# Patient Record
Sex: MALE | Race: WHITE | NOT HISPANIC OR LATINO | Employment: FULL TIME | URBAN - METROPOLITAN AREA
[De-identification: names, ages, dates, MRNs, and addresses within clinical notes are randomized per-mention and may not be internally consistent; named-entity substitution may affect disease eponyms.]

---

## 2019-04-17 ENCOUNTER — EVALUATION (OUTPATIENT)
Dept: PHYSICAL THERAPY | Facility: REHABILITATION | Age: 49
End: 2019-04-17
Payer: COMMERCIAL

## 2019-04-17 DIAGNOSIS — M54.12 CERVICAL RADICULOPATHY: Primary | ICD-10-CM

## 2019-04-17 PROCEDURE — 97163 PT EVAL HIGH COMPLEX 45 MIN: CPT | Performed by: PHYSICAL THERAPIST

## 2019-04-17 PROCEDURE — 97012 MECHANICAL TRACTION THERAPY: CPT | Performed by: PHYSICAL THERAPIST

## 2019-04-19 ENCOUNTER — OFFICE VISIT (OUTPATIENT)
Dept: PHYSICAL THERAPY | Facility: REHABILITATION | Age: 49
End: 2019-04-19
Payer: COMMERCIAL

## 2019-04-19 DIAGNOSIS — M54.12 CERVICAL RADICULOPATHY: Primary | ICD-10-CM

## 2019-04-19 PROCEDURE — 97140 MANUAL THERAPY 1/> REGIONS: CPT | Performed by: PHYSICAL THERAPIST

## 2019-04-22 ENCOUNTER — OFFICE VISIT (OUTPATIENT)
Dept: PHYSICAL THERAPY | Facility: REHABILITATION | Age: 49
End: 2019-04-22
Payer: COMMERCIAL

## 2019-04-22 ENCOUNTER — TRANSCRIBE ORDERS (OUTPATIENT)
Dept: PHYSICAL THERAPY | Facility: REHABILITATION | Age: 49
End: 2019-04-22

## 2019-04-22 DIAGNOSIS — M54.12 CERVICAL RADICULOPATHY: Primary | ICD-10-CM

## 2019-04-22 PROCEDURE — 97112 NEUROMUSCULAR REEDUCATION: CPT | Performed by: PHYSICAL THERAPIST

## 2019-04-22 PROCEDURE — 97140 MANUAL THERAPY 1/> REGIONS: CPT | Performed by: PHYSICAL THERAPIST

## 2019-04-22 PROCEDURE — 97012 MECHANICAL TRACTION THERAPY: CPT | Performed by: PHYSICAL THERAPIST

## 2019-04-24 ENCOUNTER — OFFICE VISIT (OUTPATIENT)
Dept: PHYSICAL THERAPY | Facility: REHABILITATION | Age: 49
End: 2019-04-24
Payer: COMMERCIAL

## 2019-04-24 DIAGNOSIS — M54.12 CERVICAL RADICULOPATHY: Primary | ICD-10-CM

## 2019-04-24 PROCEDURE — G0283 ELEC STIM OTHER THAN WOUND: HCPCS | Performed by: PHYSICAL THERAPIST

## 2019-04-24 PROCEDURE — 97014 ELECTRIC STIMULATION THERAPY: CPT | Performed by: PHYSICAL THERAPIST

## 2019-04-24 PROCEDURE — 97112 NEUROMUSCULAR REEDUCATION: CPT | Performed by: PHYSICAL THERAPIST

## 2019-04-24 PROCEDURE — 97140 MANUAL THERAPY 1/> REGIONS: CPT | Performed by: PHYSICAL THERAPIST

## 2019-04-26 ENCOUNTER — APPOINTMENT (OUTPATIENT)
Dept: PHYSICAL THERAPY | Facility: REHABILITATION | Age: 49
End: 2019-04-26
Payer: COMMERCIAL

## 2019-04-29 ENCOUNTER — OFFICE VISIT (OUTPATIENT)
Dept: PHYSICAL THERAPY | Facility: REHABILITATION | Age: 49
End: 2019-04-29
Payer: COMMERCIAL

## 2019-04-29 DIAGNOSIS — M54.12 CERVICAL RADICULOPATHY: Primary | ICD-10-CM

## 2019-04-29 PROCEDURE — 97140 MANUAL THERAPY 1/> REGIONS: CPT | Performed by: PHYSICAL THERAPIST

## 2019-04-29 PROCEDURE — G0283 ELEC STIM OTHER THAN WOUND: HCPCS | Performed by: PHYSICAL THERAPIST

## 2019-04-29 PROCEDURE — 97014 ELECTRIC STIMULATION THERAPY: CPT | Performed by: PHYSICAL THERAPIST

## 2019-05-01 ENCOUNTER — OFFICE VISIT (OUTPATIENT)
Dept: PHYSICAL THERAPY | Facility: REHABILITATION | Age: 49
End: 2019-05-01
Payer: COMMERCIAL

## 2019-05-01 DIAGNOSIS — M54.12 CERVICAL RADICULOPATHY: Primary | ICD-10-CM

## 2019-05-01 PROCEDURE — G0283 ELEC STIM OTHER THAN WOUND: HCPCS | Performed by: PHYSICAL THERAPIST

## 2019-05-01 PROCEDURE — 97140 MANUAL THERAPY 1/> REGIONS: CPT | Performed by: PHYSICAL THERAPIST

## 2019-05-01 PROCEDURE — 97014 ELECTRIC STIMULATION THERAPY: CPT | Performed by: PHYSICAL THERAPIST

## 2019-05-01 PROCEDURE — 97112 NEUROMUSCULAR REEDUCATION: CPT | Performed by: PHYSICAL THERAPIST

## 2019-05-03 ENCOUNTER — OFFICE VISIT (OUTPATIENT)
Dept: PHYSICAL THERAPY | Facility: REHABILITATION | Age: 49
End: 2019-05-03
Payer: COMMERCIAL

## 2019-05-03 DIAGNOSIS — M54.12 CERVICAL RADICULOPATHY: Primary | ICD-10-CM

## 2019-05-03 PROCEDURE — 97112 NEUROMUSCULAR REEDUCATION: CPT | Performed by: PHYSICAL THERAPIST

## 2019-05-03 PROCEDURE — 97140 MANUAL THERAPY 1/> REGIONS: CPT | Performed by: PHYSICAL THERAPIST

## 2019-05-06 ENCOUNTER — APPOINTMENT (OUTPATIENT)
Dept: PHYSICAL THERAPY | Facility: REHABILITATION | Age: 49
End: 2019-05-06
Payer: COMMERCIAL

## 2019-05-08 ENCOUNTER — OFFICE VISIT (OUTPATIENT)
Dept: PHYSICAL THERAPY | Facility: REHABILITATION | Age: 49
End: 2019-05-08
Payer: COMMERCIAL

## 2019-05-08 DIAGNOSIS — M54.12 CERVICAL RADICULOPATHY: Primary | ICD-10-CM

## 2019-05-08 PROCEDURE — 97112 NEUROMUSCULAR REEDUCATION: CPT | Performed by: PHYSICAL THERAPIST

## 2019-05-08 PROCEDURE — 97140 MANUAL THERAPY 1/> REGIONS: CPT | Performed by: PHYSICAL THERAPIST

## 2019-05-08 PROCEDURE — 97014 ELECTRIC STIMULATION THERAPY: CPT | Performed by: PHYSICAL THERAPIST

## 2019-05-08 PROCEDURE — G0283 ELEC STIM OTHER THAN WOUND: HCPCS | Performed by: PHYSICAL THERAPIST

## 2019-05-10 ENCOUNTER — OFFICE VISIT (OUTPATIENT)
Dept: PHYSICAL THERAPY | Facility: REHABILITATION | Age: 49
End: 2019-05-10
Payer: COMMERCIAL

## 2019-05-10 DIAGNOSIS — M54.12 CERVICAL RADICULOPATHY: Primary | ICD-10-CM

## 2019-05-10 PROCEDURE — 97112 NEUROMUSCULAR REEDUCATION: CPT | Performed by: PHYSICAL THERAPIST

## 2019-05-10 PROCEDURE — 97140 MANUAL THERAPY 1/> REGIONS: CPT | Performed by: PHYSICAL THERAPIST

## 2019-05-13 ENCOUNTER — OFFICE VISIT (OUTPATIENT)
Dept: PHYSICAL THERAPY | Facility: REHABILITATION | Age: 49
End: 2019-05-13
Payer: COMMERCIAL

## 2019-05-13 DIAGNOSIS — M54.12 CERVICAL RADICULOPATHY: Primary | ICD-10-CM

## 2019-05-13 PROCEDURE — 97112 NEUROMUSCULAR REEDUCATION: CPT | Performed by: PHYSICAL THERAPIST

## 2019-05-13 PROCEDURE — 97140 MANUAL THERAPY 1/> REGIONS: CPT | Performed by: PHYSICAL THERAPIST

## 2019-05-15 ENCOUNTER — OFFICE VISIT (OUTPATIENT)
Dept: PHYSICAL THERAPY | Facility: REHABILITATION | Age: 49
End: 2019-05-15
Payer: COMMERCIAL

## 2019-05-15 DIAGNOSIS — M54.12 CERVICAL RADICULOPATHY: Primary | ICD-10-CM

## 2019-05-15 PROCEDURE — 97112 NEUROMUSCULAR REEDUCATION: CPT

## 2019-05-15 PROCEDURE — 97140 MANUAL THERAPY 1/> REGIONS: CPT

## 2019-05-17 ENCOUNTER — APPOINTMENT (OUTPATIENT)
Dept: PHYSICAL THERAPY | Facility: REHABILITATION | Age: 49
End: 2019-05-17
Payer: COMMERCIAL

## 2019-05-20 ENCOUNTER — OFFICE VISIT (OUTPATIENT)
Dept: PHYSICAL THERAPY | Facility: REHABILITATION | Age: 49
End: 2019-05-20
Payer: COMMERCIAL

## 2019-05-20 DIAGNOSIS — M54.12 CERVICAL RADICULOPATHY: Primary | ICD-10-CM

## 2019-05-20 PROCEDURE — 97140 MANUAL THERAPY 1/> REGIONS: CPT | Performed by: PHYSICAL THERAPIST

## 2019-05-20 PROCEDURE — 97112 NEUROMUSCULAR REEDUCATION: CPT | Performed by: PHYSICAL THERAPIST

## 2019-05-21 ENCOUNTER — OFFICE VISIT (OUTPATIENT)
Dept: PHYSICAL THERAPY | Facility: REHABILITATION | Age: 49
End: 2019-05-21
Payer: COMMERCIAL

## 2019-05-21 DIAGNOSIS — M54.12 CERVICAL RADICULOPATHY: Primary | ICD-10-CM

## 2019-05-21 PROCEDURE — 97112 NEUROMUSCULAR REEDUCATION: CPT

## 2019-05-21 PROCEDURE — 97140 MANUAL THERAPY 1/> REGIONS: CPT

## 2019-05-23 ENCOUNTER — EVALUATION (OUTPATIENT)
Dept: PHYSICAL THERAPY | Facility: REHABILITATION | Age: 49
End: 2019-05-23
Payer: COMMERCIAL

## 2019-05-23 DIAGNOSIS — M54.12 CERVICAL RADICULOPATHY: Primary | ICD-10-CM

## 2019-05-23 PROCEDURE — 97140 MANUAL THERAPY 1/> REGIONS: CPT | Performed by: PHYSICAL THERAPIST

## 2019-05-23 PROCEDURE — 97112 NEUROMUSCULAR REEDUCATION: CPT | Performed by: PHYSICAL THERAPIST

## 2019-05-28 ENCOUNTER — OFFICE VISIT (OUTPATIENT)
Dept: PHYSICAL THERAPY | Facility: REHABILITATION | Age: 49
End: 2019-05-28
Payer: COMMERCIAL

## 2019-05-28 DIAGNOSIS — M54.12 CERVICAL RADICULOPATHY: Primary | ICD-10-CM

## 2019-05-30 ENCOUNTER — OFFICE VISIT (OUTPATIENT)
Dept: PHYSICAL THERAPY | Facility: REHABILITATION | Age: 49
End: 2019-05-30
Payer: COMMERCIAL

## 2019-05-30 DIAGNOSIS — M54.12 CERVICAL RADICULOPATHY: Primary | ICD-10-CM

## 2019-05-30 PROCEDURE — 97112 NEUROMUSCULAR REEDUCATION: CPT | Performed by: PHYSICAL THERAPIST

## 2019-05-30 PROCEDURE — 97140 MANUAL THERAPY 1/> REGIONS: CPT | Performed by: PHYSICAL THERAPIST

## 2019-06-03 ENCOUNTER — OFFICE VISIT (OUTPATIENT)
Dept: PHYSICAL THERAPY | Facility: REHABILITATION | Age: 49
End: 2019-06-03
Payer: COMMERCIAL

## 2019-06-03 DIAGNOSIS — M54.12 CERVICAL RADICULOPATHY: Primary | ICD-10-CM

## 2019-06-03 PROCEDURE — 97112 NEUROMUSCULAR REEDUCATION: CPT | Performed by: PHYSICAL THERAPIST

## 2019-06-03 PROCEDURE — 97140 MANUAL THERAPY 1/> REGIONS: CPT | Performed by: PHYSICAL THERAPIST

## 2019-06-04 ENCOUNTER — APPOINTMENT (OUTPATIENT)
Dept: PHYSICAL THERAPY | Facility: REHABILITATION | Age: 49
End: 2019-06-04
Payer: COMMERCIAL

## 2019-06-06 ENCOUNTER — OFFICE VISIT (OUTPATIENT)
Dept: PHYSICAL THERAPY | Facility: REHABILITATION | Age: 49
End: 2019-06-06
Payer: COMMERCIAL

## 2019-06-06 DIAGNOSIS — M54.12 CERVICAL RADICULOPATHY: Primary | ICD-10-CM

## 2019-06-06 PROCEDURE — 97140 MANUAL THERAPY 1/> REGIONS: CPT | Performed by: PHYSICAL THERAPIST

## 2019-06-06 PROCEDURE — 97112 NEUROMUSCULAR REEDUCATION: CPT | Performed by: PHYSICAL THERAPIST

## 2019-06-10 ENCOUNTER — OFFICE VISIT (OUTPATIENT)
Dept: PHYSICAL THERAPY | Facility: REHABILITATION | Age: 49
End: 2019-06-10
Payer: COMMERCIAL

## 2019-06-10 DIAGNOSIS — M54.12 CERVICAL RADICULOPATHY: Primary | ICD-10-CM

## 2019-06-10 PROCEDURE — 97140 MANUAL THERAPY 1/> REGIONS: CPT

## 2019-06-10 PROCEDURE — 97110 THERAPEUTIC EXERCISES: CPT

## 2019-06-11 ENCOUNTER — OFFICE VISIT (OUTPATIENT)
Dept: PHYSICAL THERAPY | Facility: REHABILITATION | Age: 49
End: 2019-06-11
Payer: COMMERCIAL

## 2019-06-11 DIAGNOSIS — M54.12 CERVICAL RADICULOPATHY: Primary | ICD-10-CM

## 2019-06-11 PROCEDURE — 97140 MANUAL THERAPY 1/> REGIONS: CPT | Performed by: PHYSICAL THERAPIST

## 2019-06-11 PROCEDURE — 97110 THERAPEUTIC EXERCISES: CPT | Performed by: PHYSICAL THERAPIST

## 2019-06-13 ENCOUNTER — OFFICE VISIT (OUTPATIENT)
Dept: PHYSICAL THERAPY | Facility: REHABILITATION | Age: 49
End: 2019-06-13
Payer: COMMERCIAL

## 2019-06-13 DIAGNOSIS — M54.12 CERVICAL RADICULOPATHY: Primary | ICD-10-CM

## 2019-06-13 PROCEDURE — 97110 THERAPEUTIC EXERCISES: CPT | Performed by: PHYSICAL THERAPIST

## 2019-06-13 PROCEDURE — 97140 MANUAL THERAPY 1/> REGIONS: CPT | Performed by: PHYSICAL THERAPIST

## 2019-06-17 ENCOUNTER — OFFICE VISIT (OUTPATIENT)
Dept: PHYSICAL THERAPY | Facility: REHABILITATION | Age: 49
End: 2019-06-17
Payer: COMMERCIAL

## 2019-06-17 DIAGNOSIS — M54.12 CERVICAL RADICULOPATHY: Primary | ICD-10-CM

## 2019-06-17 PROCEDURE — 97112 NEUROMUSCULAR REEDUCATION: CPT | Performed by: PHYSICAL THERAPIST

## 2019-06-17 PROCEDURE — 97140 MANUAL THERAPY 1/> REGIONS: CPT | Performed by: PHYSICAL THERAPIST

## 2019-06-20 ENCOUNTER — OFFICE VISIT (OUTPATIENT)
Dept: PHYSICAL THERAPY | Facility: REHABILITATION | Age: 49
End: 2019-06-20
Payer: COMMERCIAL

## 2019-06-20 DIAGNOSIS — M54.12 CERVICAL RADICULOPATHY: Primary | ICD-10-CM

## 2019-06-20 PROCEDURE — 97112 NEUROMUSCULAR REEDUCATION: CPT | Performed by: PHYSICAL THERAPIST

## 2019-06-20 PROCEDURE — 97140 MANUAL THERAPY 1/> REGIONS: CPT | Performed by: PHYSICAL THERAPIST

## 2019-06-24 ENCOUNTER — OFFICE VISIT (OUTPATIENT)
Dept: PHYSICAL THERAPY | Facility: REHABILITATION | Age: 49
End: 2019-06-24
Payer: COMMERCIAL

## 2019-06-24 DIAGNOSIS — M54.12 CERVICAL RADICULOPATHY: Primary | ICD-10-CM

## 2019-06-24 PROCEDURE — 97112 NEUROMUSCULAR REEDUCATION: CPT

## 2019-06-24 PROCEDURE — 97140 MANUAL THERAPY 1/> REGIONS: CPT

## 2019-06-27 ENCOUNTER — OFFICE VISIT (OUTPATIENT)
Dept: PHYSICAL THERAPY | Facility: REHABILITATION | Age: 49
End: 2019-06-27
Payer: COMMERCIAL

## 2019-06-27 DIAGNOSIS — M54.12 CERVICAL RADICULOPATHY: Primary | ICD-10-CM

## 2019-06-27 PROCEDURE — 97112 NEUROMUSCULAR REEDUCATION: CPT | Performed by: PHYSICAL THERAPIST

## 2019-06-27 PROCEDURE — 97140 MANUAL THERAPY 1/> REGIONS: CPT | Performed by: PHYSICAL THERAPIST

## 2019-07-01 ENCOUNTER — EVALUATION (OUTPATIENT)
Dept: PHYSICAL THERAPY | Facility: REHABILITATION | Age: 49
End: 2019-07-01
Payer: COMMERCIAL

## 2019-07-01 DIAGNOSIS — M54.12 CERVICAL RADICULOPATHY: Primary | ICD-10-CM

## 2019-07-01 PROCEDURE — 97112 NEUROMUSCULAR REEDUCATION: CPT | Performed by: PHYSICAL THERAPIST

## 2019-07-01 PROCEDURE — 97140 MANUAL THERAPY 1/> REGIONS: CPT | Performed by: PHYSICAL THERAPIST

## 2019-07-01 NOTE — PROGRESS NOTES
Daily Note     Today's date: 2019  Patient name: Penelope Restrepo  : 1970  MRN: [id number]  Referring provider: Pb Horta MD  Dx:   Encounter Diagnosis     ICD-10-CM    1  Cervical radiculopathy M54 12                   Subjective:    He thinks he is making progress since IE; he reports less pain on average throughout the day, and improved cervical ROM  However, he does not think he is making as much progress as he can since he has stopped taking the Humira        Objective: See treatment diary below  Neurological Testing     Sensation   Cervical   Left   Intact: light touch    Comments   Right light touch: Diminished over C6, C7, T1; intact all other dermatomes     Active Range of Motion   Cervical/Thoracic Spine       Cervical  Flexion: 45 degrees with pain   Extension: 45 degrees; 55 degrees after manual traction      with pain  Left lateral flexion: 25 degrees     with pain  Right lateral flexion: 30 degrees     with pain  Left rotation: 55 degrees  Right rotation: 45 degrees    with pain    Strength/Myotome Testing   Cervical Spine     Left   Interossei strength (t1): 4    Right   Interossei strength (t1): 4    Left Shoulder     Planes of Motion   Flexion: 5     Right Shoulder     Planes of Motion   Flexion: 4 with pain     Left Elbow   Flexion: 5  Extension: 5    Right Elbow   Flexion: 3+  Extension: 4 limited by pain    Left Wrist/Hand   Wrist extension: 5  Wrist flexion: 5  Radial deviation: 4 limited by pain  Ulnar deviation: 4 limited by pain  Thumb extension: 3+    Right Wrist/Hand   Wrist extension: 5 limited by pain  Wrist flexion: 4 limited by pain  Radial deviation: 4  Ulnar deviation: 4   Thumb extension: 4- NA    Precautions: psoriatic arthritis    Daily Treatment Diary     Manual         Manual tx  5 min 5 min NMT KAB AP       tx retraction             STM  cerv & graston  STM bicep KB NMT w/JM NMT CD KAB AP       Median nerve glides Manual SB right  mobilization NMT  NMT  AP       Seated PA glides of C7 and T1 Prone PA mobs to Cspine and upper thoracic KB Seated NMT NMT KAB AP         Exercise Diary  6/13 6/20 6-24 6/27 7/1       Supine cerv retr             Median nerve sliders             Supine cerv  SB R             Seated cerv retr 3x10 4x10 4x10 x 4x10 4x10        Median nerve glides             Elbow curls             Right SB declined Mid range           Right SB w/OP             Mid range cervical ext x15  2x x         scap retr x30            Seated cerv retr w/ R rot   Rot w/slight flex 2x10 x x15   x15       Seated cervical extension      2x10                                                                                                                 Assessment: Re-evaluation today showed reduced B UE strength; however, the patient reported he was mostly limited by pain secondary to not being on his Humira medication  Cervical ROM has increased or been maintain with the exception of B rotation which has reduced; cervical rotation and extension showed improvements throughout the session with manual cervical traction and exercises  Additionally, patient reports less pain with cervical ROM compared to IE  Re-evaluation today did no show significant improvement in objective physical impairments, however the patient reports that he is having increased symptoms and pain into the UE's secondary to not taking his Humira medication  He subjectively feels he is improving with PT  Plan: Continue per plan of care

## 2019-07-10 ENCOUNTER — TELEPHONE (OUTPATIENT)
Dept: INTERNAL MEDICINE CLINIC | Age: 49
End: 2019-07-10

## 2019-07-10 NOTE — TELEPHONE ENCOUNTER
Received a request from [facility] [place] [id number] in a request for medical records on this patient  For Social Security purposes      I sent the request to [facility] for them to process this request

## 2019-07-11 ENCOUNTER — OFFICE VISIT (OUTPATIENT)
Dept: PHYSICAL THERAPY | Facility: REHABILITATION | Age: 49
End: 2019-07-11
Payer: COMMERCIAL

## 2019-07-11 DIAGNOSIS — M54.12 CERVICAL RADICULOPATHY: Primary | ICD-10-CM

## 2019-07-11 PROCEDURE — 97140 MANUAL THERAPY 1/> REGIONS: CPT

## 2019-07-11 PROCEDURE — 97110 THERAPEUTIC EXERCISES: CPT

## 2019-07-11 NOTE — PROGRESS NOTES
Daily Note     Today's date: 2019  Patient name: Penny Lazo  : 1970  MRN: [id number]  Referring provider: Vinny Schwartz MD  Dx:   Encounter Diagnosis     ICD-10-CM    1  Cervical radiculopathy M54 12        Start Time: 1030  Stop Time: 1115  Total time in clinic (min): 45 minutes    Subjective: Patient states he is feeling about the same  Still generally sore  Objective: See treatment diary below      Assessment: Tolerated treatment fairly well  Relief with manual work  Noted tenderness mid cervical spine  Plan: Continue c plan  Manual  -      Manual tx  5 min 5 min NMT KAB AP GF      tx retraction             STM  cerv & graston  STM bicep KB NMT w/JM NMT CD KAB AP GF      Median nerve glides             Manual SB right  mobilization NMT  NMT  AP       Seated PA glides of C7 and T1 Prone PA mobs to Cspine and upper thoracic KB Seated NMT NMT KAB AP         Exercise Diary  -      Supine cerv retr             Median nerve sliders             Supine cerv   SB R             Seated cerv retr 3x10 4x10 4x10 x 4x10 4x10  x      Median nerve glides             Elbow curls             Right SB declined Mid range           Right SB w/OP             Mid range cervical ext x15  2x x   x      scap retr x30            Seated cerv retr w/ R rot   Rot w/slight flex 2x10 x x15   x15 x      Seated cervical extension      2x10 x

## 2019-07-11 NOTE — PROGRESS NOTES
Daily Note     Today's date: 2019  Patient name: Saritha Wang  : 1970  MRN: [id number]  Referring provider: Brandi Blancas MD  Dx:   Encounter Diagnosis     ICD-10-CM    1  Cervical radiculopathy M54 12                   Subjective: Patient states he is feeling about the same       Objective: See treatment diary below  Neurological Testing     Sensation   Cervical   Left   Intact: light touch    Comments   Right light touch: Diminished over C6, C7, T1; intact all other dermatomes     Active Range of Motion   Cervical/Thoracic Spine       Cervical  Flexion: 45 degrees with pain   Extension: 45 degrees; 55 degrees after manual traction      with pain  Left lateral flexion: 25 degrees     with pain  Right lateral flexion: 30 degrees     with pain  Left rotation: 55 degrees  Right rotation: 45 degrees    with pain    Strength/Myotome Testing   Cervical Spine     Left   Interossei strength (t1): 4    Right   Interossei strength (t1): 4    Left Shoulder     Planes of Motion   Flexion: 5     Right Shoulder     Planes of Motion   Flexion: 4 with pain     Left Elbow   Flexion: 5  Extension: 5    Right Elbow   Flexion: 3+  Extension: 4 limited by pain    Left Wrist/Hand   Wrist extension: 5  Wrist flexion: 5  Radial deviation: 4 limited by pain  Ulnar deviation: 4 limited by pain  Thumb extension: 3+    Right Wrist/Hand   Wrist extension: 5 limited by pain  Wrist flexion: 4 limited by pain  Radial deviation: 4  Ulnar deviation: 4   Thumb extension: 4- NA    Precautions: psoriatic arthritis    Daily Treatment Diary     Manual        Manual tx  5 min 5 min NMT KAB AP GF      tx retraction             STM  cerv & graston  STM bicep KB NMT w/JM NMT CD KAB AP GF      Median nerve glides             Manual SB right  mobilization NMT  NMT  AP       Seated PA glides of C7 and T1 Prone PA mobs to Cspine and upper thoracic KB Seated NMT NMT KAB AP         Exercise Diary         Supine cerv retr             Median nerve sliders             Supine cerv  SB R             Seated cerv retr 3x10 4x10 4x10 x 4x10 4x10  x      Median nerve glides             Elbow curls             Right SB declined Mid range           Right SB w/OP             Mid range cervical ext x15  2x x   x      scap retr x30            Seated cerv retr w/ R rot   Rot w/slight flex 2x10 x x15   x15 x      Seated cervical extension      2x10 x                                                                                                                Assessment: Patient had some relief after manual work and massage to cervical spine  Tenderness with palpation mid cervical spine persists  Plan: Continue c plan  Today's date: 2019  Patient name: Dawson Arriaza  : 1970  MRN: [id number]  Referring provider: Yennifer Ramos MD  Dx:   Encounter Diagnosis     ICD-10-CM    1  Cervical radiculopathy M54 12                   Subjective: see above for note       Objective: See treatment diary below      Assessment: Tolerated treatment well  Plan: continue with plan     {There is no content from the last Daily Treatment Diary section  }

## 2019-07-15 ENCOUNTER — OFFICE VISIT (OUTPATIENT)
Dept: PHYSICAL THERAPY | Facility: REHABILITATION | Age: 49
End: 2019-07-15
Payer: COMMERCIAL

## 2019-07-15 DIAGNOSIS — M54.12 CERVICAL RADICULOPATHY: Primary | ICD-10-CM

## 2019-07-15 PROCEDURE — 97110 THERAPEUTIC EXERCISES: CPT | Performed by: PHYSICAL THERAPIST

## 2019-07-15 PROCEDURE — 97140 MANUAL THERAPY 1/> REGIONS: CPT | Performed by: PHYSICAL THERAPIST

## 2019-07-15 NOTE — PROGRESS NOTES
Daily Note     Today's date: 7/15/2019  Patient name: John Schmitt  : 1970  MRN: [id number]  Referring provider: Meka Almanzar MD  Dx:   Encounter Diagnosis     ICD-10-CM    1  Cervical radiculopathy M54 12                   Subjective: Pt reports "I feel a lot better with PT, I can actually do stuff around the house " He rates his cervical and right upper extremity pain at 6/10 prior to session today  Objective: See treatment diary below  Manual  -24 6/27 7/1 7/11 7/15     Manual tx  5 min 5 min NMT SALVADOR LOPEZ MD     tx retraction             STM  cerv & graston  STM bicep KB NMT w/JM NMT CD SALVADOR PALMA GF MD     Median nerve glides             Manual SB right  mobilization NMT  NMT  AP  MD     Seated PA glides of C7 and T1 Prone PA mobs to Cspine and upper thoracic KB Seated NMT NMT SALVADOR PALMA MD       Exercise Diary  -24 6/27 7/1 7/11 7/15     Supine cerv retr             Median nerve sliders             Supine cerv  SB R             Seated cerv retr 3x10 4x10 4x10 x 4x10 4x10  x 5"  4x10     Median nerve glides             Elbow curls             Right SB declined Mid range           Right SB w/OP             Mid range cervical ext x15  2x x   x x     scap retr x30            Seated cerv retr w/ R rot   Rot w/slight flex 2x10 x x15   x15 x 5"x15 to R     Seated cervical extension      2x10 x x                    Assessment: Pt demonstrated good knowledge, tolerance and performance with current program reporting mild increase in soreness with completion of session  Pt noted mild increase in right upper extremity pain with seated cervical extension that resolved with completion  He continues with moderate bilateral UT, LS and biceps myofascial restrictions, all with improved mobility following manual techniques  Pt will benefit from continued skilled PT intervention in order to address his remaining limitations and to restore maximal function  Plan: Continue c plan

## 2019-07-18 ENCOUNTER — OFFICE VISIT (OUTPATIENT)
Dept: PHYSICAL THERAPY | Facility: REHABILITATION | Age: 49
End: 2019-07-18
Payer: COMMERCIAL

## 2019-07-18 DIAGNOSIS — M54.12 CERVICAL RADICULOPATHY: Primary | ICD-10-CM

## 2019-07-18 PROCEDURE — 97140 MANUAL THERAPY 1/> REGIONS: CPT

## 2019-07-18 NOTE — PROGRESS NOTES
Daily Note     Today's date: 2019  Patient name: Last Caballero  : 1970  MRN: [id number]  Referring provider: Madhav Santamaria MD  Dx:   Encounter Diagnosis     ICD-10-CM    1  Cervical radiculopathy M54 12                   Subjective: Pt reports he feels his ROM is improved  Objective: See treatment diary below  Manual  -24 6/27 7/1 7/11 7/15 7-    Manual tx  5 min 5 min NMT KAALHAJI AP JOHN MD     tx retraction             STM  cerv & graston  STM bicep KB NMT w/JM NMT CD KAB AP GF MD CD    Median nerve glides             Manual SB right  mobilization NMT  NMT  AP  MD     Seated PA glides of C7 and T1 Prone PA mobs to Cspine and upper thoracic KB Seated NMT NMT SALVADOR PALMA MD       Exercise Diary  -24 6/27 7/1 7/11 7/15 7-    Supine cerv retr             Median nerve sliders             Supine cerv  SB R             Seated cerv retr 3x10 4x10 4x10 x 4x10 4x10  x 5"  4x10 x    Median nerve glides             Elbow curls      ```       Right SB declined Mid range           Right SB w/OP             Mid range cervical ext x15  2x x   x x     scap retr x30            Seated cerv retr w/ R rot   Rot w/slight flex 2x10 x x15   x15 x 5"x15 to R     Seated cervical extension      2x10 x x                    Assessment:   Declined MOBS & man TX, opted to do ex at home as he wants to go see family MD    Plan: Continue c plan

## 2019-07-22 ENCOUNTER — APPOINTMENT (OUTPATIENT)
Dept: PHYSICAL THERAPY | Facility: REHABILITATION | Age: 49
End: 2019-07-22
Payer: COMMERCIAL

## 2019-07-25 ENCOUNTER — OFFICE VISIT (OUTPATIENT)
Dept: PHYSICAL THERAPY | Facility: REHABILITATION | Age: 49
End: 2019-07-25
Payer: COMMERCIAL

## 2019-07-25 DIAGNOSIS — M54.12 CERVICAL RADICULOPATHY: Primary | ICD-10-CM

## 2019-07-25 PROCEDURE — 97112 NEUROMUSCULAR REEDUCATION: CPT | Performed by: PHYSICAL THERAPIST

## 2019-07-25 PROCEDURE — 97140 MANUAL THERAPY 1/> REGIONS: CPT | Performed by: PHYSICAL THERAPIST

## 2019-07-25 NOTE — PROGRESS NOTES
Daily Note     Today's date: 2019  Patient name: Cisco Martines  : 1970  MRN: [id number]  Referring provider: Gunner Moser MD  Dx:   Encounter Diagnosis     ICD-10-CM    1  Cervical radiculopathy M54 12                   Subjective: Neck causes minimal discomfort if he does the exercises  Some pain yet limiting ext, rot and SB R however with repetition this reduces  His right bicep is in spasm again and he notes that his rheumatologist states this is due to crystal build up in the tendon  Objective: See treatment diary below  Manual  6/13 6/17 6/20 6-24 6/27 7/1 7/11 7/15 7-18 7/25   Manual tx  5 min 5 min NMT KAB AP GF MD     tx retraction             STM  cerv & graston  STM bicep KB NMT w/JM NMT CD KAB AP GF MD CD NT/Brief cerv   Median nerve glides             Manual SB right  mobilization NMT  NMT  AP  MD     Seated PA glides of C7 and T1 Prone PA mobs to Cspine and upper thoracic KB Seated NMT NMT KAALHAJI AP  MD  NT     Exercise Diary  -24 6/27 7/1 7/11 7/15 7-18 7/25   Posterior capsule stretch          30"   Biceps stretch          30"   Supine cerv  SB R             Seated cerv retr 3x10 4x10 4x10 x 4x10 4x10  x 5"  4x10 x x   Median nerve glides             Elbow curls      ```       Right SB declined Mid range           Right SB w/OP             Mid range cervical ext x15  2x x   x x  x   scap retr x30            Seated cerv retr w/ R rot   Rot w/slight flex 2x10 x x15   x15 x 5"x15 to R  x   Seated cervical extension      2x10 x x  x                  Assessment:   Pt would benefit from maintenance IASTM  He is considering purchasing a tool and having his wife learn the technique or continuing private pay  Plan: Continue 1 visit and discus options

## 2019-07-29 ENCOUNTER — APPOINTMENT (OUTPATIENT)
Dept: PHYSICAL THERAPY | Facility: REHABILITATION | Age: 49
End: 2019-07-29
Payer: COMMERCIAL

## 2019-08-01 ENCOUNTER — OFFICE VISIT (OUTPATIENT)
Dept: PHYSICAL THERAPY | Facility: REHABILITATION | Age: 49
End: 2019-08-01
Payer: COMMERCIAL

## 2019-08-01 DIAGNOSIS — M54.12 CERVICAL RADICULOPATHY: Primary | ICD-10-CM

## 2019-08-01 PROCEDURE — 97112 NEUROMUSCULAR REEDUCATION: CPT

## 2019-08-01 PROCEDURE — 97140 MANUAL THERAPY 1/> REGIONS: CPT

## 2019-08-01 NOTE — PROGRESS NOTES
Daily Note     Today's date: 2019  Patient name: Thayne Klinefelter  : 1970  MRN: [id number]  Referring provider: Ferny Ji MD  Dx:   Encounter Diagnosis     ICD-10-CM    1  Cervical radiculopathy M54 12                   Subjective: Neck has been feeling pretty good, can control symptoms with ex  Bicep & forearm are in spasm & painful  Objective: See treatment diary below  Manual  8-1 6/17 6/20 6-24 6/27 7/1 7/11 7/15 7-18 7/25   Manual tx  5 min 5 min NMT KAB AP JOHN NAVARRETE     tx retraction             STM  cerv & graston  STM bicep NT & CD NMT w/JM NMT CD KAB AP GF MD CD NT/Brief cerv   Median nerve glides             Manual SB right  mobilization NMT  NMT  AP  MD     Seated PA glides of C7 and T1 NMT Seated NMT NMT KAALHAJI AP  MD  NT     Exercise Diary  -24 6/27 7/1 7/11 7/15 7-18 7/25   Posterior capsule stretch          30"   Biceps stretch          30"   Supine cerv  SB R             Seated cerv retr 3x10 4x10 4x10 x 4x10 4x10  x 5"  4x10 x x   Median nerve glides             Elbow curls      ```       Right SB 10x Mid range           Right SB w/OP             Mid range cervical ext x15  2x x   x x  x   scap retr             Seated cerv retr w/ R rot   Rot w/slight flex 2x10 x x15   x15 x 5"x15 to R  x   Seated cervical extension      2x10 x x  x                  Assessment:   Pt did order Graston tools  NT PT worked on his neck while I did Graston  Bicep & forearm were very tight, restricted     Better after treatment    Plan:  D/C to self pay or self management at home

## 2019-08-15 ENCOUNTER — OFFICE VISIT (OUTPATIENT)
Dept: PHYSICAL THERAPY | Facility: REHABILITATION | Age: 49
End: 2019-08-15

## 2019-08-15 DIAGNOSIS — M67.813 BICEPS TENDONOSIS OF RIGHT SHOULDER: Primary | ICD-10-CM

## 2019-08-15 DIAGNOSIS — M54.12 CERVICAL RADICULOPATHY: ICD-10-CM

## 2019-08-15 PROCEDURE — 97140 MANUAL THERAPY 1/> REGIONS: CPT | Performed by: PHYSICAL THERAPIST

## 2019-08-15 NOTE — PROGRESS NOTES
Daily Note     Today's date: 8/15/2019  Patient name: Penny Lazo  : 1970  MRN: [id number]  Referring provider: Vinny Schwartz MD  Dx:   Encounter Diagnosis     ICD-10-CM    1  Biceps tendonosis of right shoulder M67 911    2  Cervical radiculopathy M54 12                   Subjective: Tried having his wife to IASTM at home but it was not very effective  Neck has been more painful recently  Objective: See treatment diary below      Assessment: Pt has exhausted his insurance and is now coming self pay  Spasm and soft tissue induration continue to occur in biceps  Biceps pain reduced after Graston and cervical pain after PA glides at C7 and T1  Plan: Continue per plan of care  Pt will continue to receive PT self pay under prescription from Dr Sara De Jesus          Precautions: psoriatic arthritis      Manual              Graston R bicep 10 min            PA glide C7,T1 sitting 5 min

## 2019-08-22 ENCOUNTER — APPOINTMENT (OUTPATIENT)
Dept: PHYSICAL THERAPY | Facility: REHABILITATION | Age: 49
End: 2019-08-22
Payer: COMMERCIAL

## 2020-01-29 ENCOUNTER — TRANSCRIBE ORDERS (OUTPATIENT)
Dept: ADMINISTRATIVE | Facility: HOSPITAL | Age: 50
End: 2020-01-29

## 2020-01-29 ENCOUNTER — APPOINTMENT (OUTPATIENT)
Dept: LAB | Facility: IMAGING CENTER | Age: 50
End: 2020-01-29
Payer: COMMERCIAL

## 2020-01-29 DIAGNOSIS — Z79.899 ENCOUNTER FOR LONG-TERM (CURRENT) USE OF OTHER MEDICATIONS: Primary | ICD-10-CM

## 2020-01-29 DIAGNOSIS — E55.9 VITAMIN D DEFICIENCY, UNSPECIFIED: ICD-10-CM

## 2020-01-29 DIAGNOSIS — Z79.899 ENCOUNTER FOR LONG-TERM (CURRENT) USE OF OTHER MEDICATIONS: ICD-10-CM

## 2020-01-29 DIAGNOSIS — Z13.220 SCREENING FOR LIPOID DISORDERS: ICD-10-CM

## 2020-01-29 DIAGNOSIS — L40.50 PSORIATIC ARTHROPATHY (HCC): ICD-10-CM

## 2020-01-29 LAB
25(OH)D3 SERPL-MCNC: 18 NG/ML (ref 30–100)
ALBUMIN SERPL BCP-MCNC: 4.1 G/DL (ref 3.5–5)
ALP SERPL-CCNC: 53 U/L (ref 46–116)
ALT SERPL W P-5'-P-CCNC: 53 U/L (ref 12–78)
ANION GAP SERPL CALCULATED.3IONS-SCNC: 2 MMOL/L (ref 4–13)
AST SERPL W P-5'-P-CCNC: 21 U/L (ref 5–45)
BASOPHILS # BLD AUTO: 0.09 THOUSANDS/ΜL (ref 0–0.1)
BASOPHILS NFR BLD AUTO: 2 % (ref 0–1)
BILIRUB SERPL-MCNC: 0.56 MG/DL (ref 0.2–1)
BUN SERPL-MCNC: 20 MG/DL (ref 5–25)
CALCIUM SERPL-MCNC: 9.8 MG/DL (ref 8.3–10.1)
CHLORIDE SERPL-SCNC: 109 MMOL/L (ref 100–108)
CHOLEST SERPL-MCNC: 241 MG/DL (ref 50–200)
CO2 SERPL-SCNC: 31 MMOL/L (ref 21–32)
CREAT SERPL-MCNC: 0.99 MG/DL (ref 0.6–1.3)
CRP SERPL QL: <3 MG/L
EOSINOPHIL # BLD AUTO: 0.21 THOUSAND/ΜL (ref 0–0.61)
EOSINOPHIL NFR BLD AUTO: 4 % (ref 0–6)
ERYTHROCYTE [DISTWIDTH] IN BLOOD BY AUTOMATED COUNT: 13.5 % (ref 11.6–15.1)
ERYTHROCYTE [SEDIMENTATION RATE] IN BLOOD: 2 MM/HOUR (ref 0–10)
GFR SERPL CREATININE-BSD FRML MDRD: 89 ML/MIN/1.73SQ M
GLUCOSE P FAST SERPL-MCNC: 93 MG/DL (ref 65–99)
HCT VFR BLD AUTO: 48.9 % (ref 36.5–49.3)
HDLC SERPL-MCNC: 48 MG/DL
HGB BLD-MCNC: 16.7 G/DL (ref 12–17)
IMM GRANULOCYTES # BLD AUTO: 0.01 THOUSAND/UL (ref 0–0.2)
IMM GRANULOCYTES NFR BLD AUTO: 0 % (ref 0–2)
LDLC SERPL CALC-MCNC: 177 MG/DL (ref 0–100)
LYMPHOCYTES # BLD AUTO: 1.66 THOUSANDS/ΜL (ref 0.6–4.47)
LYMPHOCYTES NFR BLD AUTO: 29 % (ref 14–44)
MCH RBC QN AUTO: 31.9 PG (ref 26.8–34.3)
MCHC RBC AUTO-ENTMCNC: 34.2 G/DL (ref 31.4–37.4)
MCV RBC AUTO: 94 FL (ref 82–98)
MONOCYTES # BLD AUTO: 0.63 THOUSAND/ΜL (ref 0.17–1.22)
MONOCYTES NFR BLD AUTO: 11 % (ref 4–12)
NEUTROPHILS # BLD AUTO: 3.14 THOUSANDS/ΜL (ref 1.85–7.62)
NEUTS SEG NFR BLD AUTO: 54 % (ref 43–75)
NONHDLC SERPL-MCNC: 193 MG/DL
NRBC BLD AUTO-RTO: 0 /100 WBCS
PLATELET # BLD AUTO: 241 THOUSANDS/UL (ref 149–390)
PMV BLD AUTO: 10.2 FL (ref 8.9–12.7)
POTASSIUM SERPL-SCNC: 5 MMOL/L (ref 3.5–5.3)
PROT SERPL-MCNC: 7.3 G/DL (ref 6.4–8.2)
RBC # BLD AUTO: 5.23 MILLION/UL (ref 3.88–5.62)
SODIUM SERPL-SCNC: 142 MMOL/L (ref 136–145)
TRIGL SERPL-MCNC: 81 MG/DL
WBC # BLD AUTO: 5.74 THOUSAND/UL (ref 4.31–10.16)

## 2020-01-29 PROCEDURE — 85652 RBC SED RATE AUTOMATED: CPT

## 2020-01-29 PROCEDURE — 86140 C-REACTIVE PROTEIN: CPT

## 2020-01-29 PROCEDURE — 80061 LIPID PANEL: CPT

## 2020-01-29 PROCEDURE — 82306 VITAMIN D 25 HYDROXY: CPT

## 2020-01-29 PROCEDURE — 80053 COMPREHEN METABOLIC PANEL: CPT

## 2020-01-29 PROCEDURE — 85025 COMPLETE CBC W/AUTO DIFF WBC: CPT

## 2020-01-29 PROCEDURE — 36415 COLL VENOUS BLD VENIPUNCTURE: CPT

## 2020-02-05 ENCOUNTER — APPOINTMENT (OUTPATIENT)
Dept: PHYSICAL THERAPY | Facility: REHABILITATION | Age: 50
End: 2020-02-05
Payer: COMMERCIAL

## 2020-02-13 ENCOUNTER — EVALUATION (OUTPATIENT)
Dept: PHYSICAL THERAPY | Facility: REHABILITATION | Age: 50
End: 2020-02-13
Payer: COMMERCIAL

## 2020-02-13 DIAGNOSIS — M79.641 RIGHT HAND PAIN: ICD-10-CM

## 2020-02-13 DIAGNOSIS — M19.041 ARTHRITIS OF RIGHT HAND: Primary | ICD-10-CM

## 2020-02-13 DIAGNOSIS — L40.50 PSORIATIC ARTHRITIS (HCC): ICD-10-CM

## 2020-02-13 PROCEDURE — 97163 PT EVAL HIGH COMPLEX 45 MIN: CPT | Performed by: PHYSICAL THERAPIST

## 2020-02-13 PROCEDURE — 97112 NEUROMUSCULAR REEDUCATION: CPT | Performed by: PHYSICAL THERAPIST

## 2020-02-13 NOTE — PROGRESS NOTES
PT Evaluation     Today's date: 2020  Patient name: Timoteo Gautam  : 1970  MRN: [id number]  Referring provider: Jose Rafael Bolaños MD  Dx:   Encounter Diagnosis     ICD-10-CM    1  Arthritis of right hand M19 041    2  Right hand pain M79 641    3  Psoriatic arthritis ([facility] [address] ) L40 50                   Assessment  Assessment details: Pt is a 52year old right handed male presenting to PT with 4 year history of right hand pain with complicated medical history including cervical radiculopathy, pervious right forearm laceration, probable carpal tunnel syndrome  Nerve Conduction Study testing with significant results as follows:  Right median distal motor latency was 4 69, normal is 4 2  Low voltage  Radial nerve across the wrist and elbow was normal  Right median distal sensory latency was mildly prolonged at 3 88, normal is 3 5  MRI of his cervical spine with significant results as follows: central herniation at 5-6, 6-7  Pt presents with deficits in posture, left elbow, wrist and hand range of motion and strength  Due to these deficits, patient is limited in his ability to perform the following functional activities: using right hand and wrist, lifting, grasping and all ADLs involving using his right hand  Pt reports sleep disturbance secondary to pain waking 2 times/night  Pt is a good candidate for skilled PT intervention and will benefit from treatment in order to address their limitations and to restore maximal function  Impairments: abnormal coordination, abnormal or restricted ROM, activity intolerance, impaired physical strength, lacks appropriate home exercise program, pain with function and poor posture   Understanding of Dx/Px/POC: good   Prognosis: good    Goals  Short Term Goals: To be achieved by 4 weeks    1) Patient to be independent with basic HEP  2) Decrease pain to 4/10 at worst   3) Increase ROM by 5 degrees or greater in all deficient planes     4) Increase strength by 1/2 MMT grade or greater in all deficient planes  5) Patient to report decreased sleep interruption secondary to pain  Long Term Goals: To be achieved by discharge    1) FOTO equal to or greater than 65   2) Patient to be independent with comprehensive HEP  3) Decrease pain to 3/10 at worst  for improved quality of life  4) Increase strength to 5/5 MMT grade in all deficient planes to improve a/iadls  5) Increase ROM to within normal limits  6) Patient to report no sleep interruption secondary to pain  Plan  Patient would benefit from: PT eval and skilled PT  Planned modality interventions: cryotherapy and thermotherapy: hydrocollator packs  Planned therapy interventions: IADL retraining, body mechanics training, flexibility, functional ROM exercises, home exercise program, neuromuscular re-education, manual therapy, postural training, strengthening, stretching, therapeutic activities, therapeutic exercise and joint mobilization  Frequency: 2x week  Duration in visits: 8  Duration in weeks: 4  Treatment plan discussed with: patient        Subjective Evaluation    History of Present Illness  Mechanism of injury: Pt is a 52year old right handed male presenting to PT with 4 year history of right hand pain with complicated medical history including cervical radiculopathy, pervious right forearm laceration, probable carpal tunnel syndrome  Pt denies any preceding event, injury or trauma  Pt reports he woke up one day with pain  Has had conservative interventions including PT, acupuncture and chiropractic treatments  Pt reports he has had the most relief with PT  He went to Dr Reuben Medina for further evaluation, x-ray performed and positive for OA at multiple sites  Previous imaging results are as follows per his last MD note:   1  x-rays of his neck from 05/02/18 showing good cervical lordosis without significant arthritis  2  Nerve Conduction Study from July 2018   This was ordered by Dr Randy Wynn done by   [address] , [address]  It was noted he could not make a full fist  Right median distal motor latency was 4 69, normal is 4 2  Low voltage  Radial nerve across the wrist and elbow was normal  Right median distal sensory latency was mildly prolonged at 3 88, normal is 3 5      3   MRI of his cervical spine from July 2018 from [facility]  Summary is central herniation at 5-6, 6-7  Pt referred to OPPT, EMG testing also ordered  Pain Location: right hand and wrist    Pain Type: aching, numbness, tingling    Pain Intensity:  Current: 7   Best: 4  Worst: 10      Pt reports increased pain and/or difficulty with: changes in weather, using right hand and wrist, lifting, grasping and all ADLs involving using his right hand  Pt reports sleep disturbance secondary to pain waking 2 times/night  Pt reports decreased pain with: heat            Recurrent probem    Quality of life: good      Diagnostic Tests  X-ray: abnormal  EMG: abnormal  Treatments  Previous treatment: chiropractic, injection treatment, medication and physical therapy  Patient Goals  Patient goals for therapy: decreased pain, increased strength, independence with ADLs/IADLs and increased motion          Objective     Active Range of Motion     Left Elbow   Flexion: WFL  Extension: WFL  Forearm supination: WFL  Forearm pronation: WFL    Right Elbow   Flexion: WFL  Extension: -10 degrees   Forearm supination: 45 degrees   Forearm pronation: 75 degrees     Left Wrist   Wrist flexion: WFL  Wrist extension: WFL  Radial deviation: WFL  Ulnar deviation: WFL      Right Wrist   Wrist flexion: 45 degrees   Wrist extension: 15 degrees   Radial deviation: 10 degrees   Ulnar deviation: 25 degrees     Right Thumb   Flexion     CMC: 15  Extension     CMC: 0  Opposition: Unable to make contact with any digits       Right Digits   Flexion   Index     MCP: 55    PIP: 50    DIP: 45  Middle     MCP: 50    PIP: 50    DIP: 45  Ring     MCP: 45    PIP: 45    DIP: 40  Little     MCP: 45    PIP: 45    DIP: 40  Extension   Index     MCP: 0    PIP: -10    DIP: -45  Middle     MCP: 0    PIP: -10    DIP: -45  Ring     MCP: 0    PIP: -10    DIP: -40  Little     MCP: 0    PIP: -10    DIP: -40    Strength/Myotome Testing     Left Elbow   Flexion: 5  Extension: 5  Forearm supination: 5  Forearm pronation: 5    Right Elbow   Flexion: 3+  Extension: 3+  Forearm supination: 3-  Forearm pronation: 3-    Left Wrist/Hand   Normal wrist strength     (2nd hand position)     Trial 1: 95    Right Wrist/Hand   Wrist extension: 2-  Wrist flexion: 2-  Radial deviation: 2-  Ulnar deviation: 2-     (2nd hand position)     Trial 1: 35    Tests     Left Elbow   Negative Tinel's sign (cubital tunnel)  Right Elbow   Positive Tinel's sign (cubital tunnel)  Right Wrist/Hand   Positive Phalen's sign and Tinel's sign (medial nerve)         Flowsheet Rows      Most Recent Value   PT/OT G-Codes   Current Score  56   Projected Score  65             Precautions: psoriatic arthritis     Daily Treatment Diary      Assessment  2/13                     Eval/Revseth NAVARRETE                     FOTO  56/65       **         **   POC Signed                       HEP Issued   yes                     Manuals                                               Exercise Diary   2/13                     *Elbow AROM: Pro/Sup                       *Wrist AROM: Flex/Ext  5"x10 ea                     *Wrist Flexors Stretch  30"x3                     *Wrist Extensors Stretch  30"x3                     *Finger Flexion/Extension AROM  5"x10 ea                     *THeraputty Opposition  Yellow  5"x10 ea                                                                                                                                                                                                                                                                     Modalities  2/13                     MHP R Hand/Wrist  Pre-Tx                          * = HEP Propranolol Pregnancy And Lactation Text: This medication is Pregnancy Category C and it isn't known if it is safe during pregnancy. It is excreted in breast milk.

## 2020-02-17 ENCOUNTER — OFFICE VISIT (OUTPATIENT)
Dept: PHYSICAL THERAPY | Facility: REHABILITATION | Age: 50
End: 2020-02-17
Payer: COMMERCIAL

## 2020-02-17 DIAGNOSIS — M79.641 RIGHT HAND PAIN: ICD-10-CM

## 2020-02-17 DIAGNOSIS — L40.50 PSORIATIC ARTHRITIS (HCC): ICD-10-CM

## 2020-02-17 DIAGNOSIS — M19.041 ARTHRITIS OF RIGHT HAND: Primary | ICD-10-CM

## 2020-02-17 PROCEDURE — 97140 MANUAL THERAPY 1/> REGIONS: CPT

## 2020-02-17 PROCEDURE — 97110 THERAPEUTIC EXERCISES: CPT

## 2020-02-17 PROCEDURE — 97112 NEUROMUSCULAR REEDUCATION: CPT

## 2020-02-17 NOTE — PROGRESS NOTES
Daily Note     Today's date: 2020  Patient name: Bassem Eldridge  : 1970  MRN: [id number]  Referring provider: Jace Perez MD  Dx:   Encounter Diagnosis     ICD-10-CM    1  Arthritis of right hand M19 041    2  Right hand pain M79 641    3  Psoriatic arthritis ([address] ) L40 50        Start Time: 1205  Stop Time: 1255  Total time in clinic (min): 50 minutes    Subjective: Pt reports he is feeling the same as usual, states the hand is stiff and painful  Objective: See treatment diary below      Assessment: Tolerated treatment well  Patient demonstrated fatigue post treatment and would benefit from continued PT  Pt tolerated today's session well with no adverse symptoms  Pt shows moderate soft tissue restrictions in common flexors with IASTM  Pt will benefit from further skilled PT to increase strength, flexibility and function  Continue to progress as able  Plan: Continue per plan of care        Precautions: psoriatic arthritis     Daily Treatment Diary      Assessment                      Cristina/Kait NAVARRETE                     FOTO   56/65       **         **   POC Signed                       HEP Issued    yes                     Manuals                            IASTM to common flexors and digits                   Exercise Diary                       *Elbow AROM: Pro/Sup   20x5"                   *Wrist AROM: Flex/Ext  5"x10 ea 20x5" ea                   *Wrist Flexors Stretch  30"x3 5x30"                   *Wrist Extensors Stretch  30"x3 5x30"                    *Finger Flexion/Extension AROM  5"x10 ea 20x5"                   *Theraputty Opposition  Yellow  5"x10 ea                                                                                                                                                                                                                                                                     Modalities                      ALISA KING Hand/Wrist  Pre-Tx    10'                      * = HEP

## 2020-02-19 ENCOUNTER — APPOINTMENT (OUTPATIENT)
Dept: PHYSICAL THERAPY | Facility: REHABILITATION | Age: 50
End: 2020-02-19
Payer: COMMERCIAL

## 2020-02-24 ENCOUNTER — OFFICE VISIT (OUTPATIENT)
Dept: PHYSICAL THERAPY | Facility: REHABILITATION | Age: 50
End: 2020-02-24
Payer: COMMERCIAL

## 2020-02-24 DIAGNOSIS — M79.641 RIGHT HAND PAIN: ICD-10-CM

## 2020-02-24 DIAGNOSIS — M19.041 ARTHRITIS OF RIGHT HAND: Primary | ICD-10-CM

## 2020-02-24 DIAGNOSIS — L40.50 PSORIATIC ARTHRITIS (HCC): ICD-10-CM

## 2020-02-24 PROCEDURE — 97112 NEUROMUSCULAR REEDUCATION: CPT

## 2020-02-24 PROCEDURE — 97140 MANUAL THERAPY 1/> REGIONS: CPT

## 2020-02-24 NOTE — PROGRESS NOTES
Daily Note     Today's date: 2020  Patient name: Edward Parks  : 1970  MRN: [id number]  Referring provider: Yulisa Fitzgerald MD  Dx:   Encounter Diagnosis     ICD-10-CM    1  Arthritis of right hand M19 041    2  Right hand pain M79 641    3  Psoriatic arthritis ([facility] [address] ) L40 50                   Subjective: Pt reports he has been more swollen in hand & forearm the last few days which he cannot attribute to anything  He reports he has a small bump on his R hand which was red & swollen, he thought it might be bug bite, he did go to MD who was not concerned  He reports he did TP ex for 15' at home      Objective: See treatment diary below      Assessment:   Over working with TP may have contributed to swelling  Bump is a little red    His whole hand is swollen yet & into forearm  PT checked him advised very light graston & retromassage to move fluid  He could not make full fist w/o asst of self stretch with L hand   He may look for compression glove  E will try to keep hand out of dependent position & will only do TP 1x day, 10 reps      Plan: Continue per plan of care        Precautions: psoriatic arthritis     Daily Treatment Diary      Assessment     2-                 Eval/Revseth NAVARRETE                     FOTO   56/65       **         **   POC Signed                       HEP Issued    yes                     Manuals                            IASTM to common flexors and digits  very light graston & retromassage                 Exercise Diary      2-24                 *Elbow AROM: Pro/Sup   20x5"  x                 *Wrist AROM: Flex/Ext  5"x10 ea 20x5" ea  x                 *Wrist Flexors Stretch  30"x3 5x30"  30"x3                 *Wrist Extensors Stretch  30"x3 5x30"   30"x3                 *Finger Flexion/Extension AROM  5"x10 ea 20x5"  x                 *Theraputty Opposition  Yellow  5"x10 ea Modalities   2/13 2/17  2-24                 MHP R Hand/Wrist  Pre-Tx    10'  x                    * = HEP

## 2020-02-26 ENCOUNTER — OFFICE VISIT (OUTPATIENT)
Dept: PHYSICAL THERAPY | Facility: REHABILITATION | Age: 50
End: 2020-02-26
Payer: COMMERCIAL

## 2020-02-26 DIAGNOSIS — M19.041 ARTHRITIS OF RIGHT HAND: Primary | ICD-10-CM

## 2020-02-26 DIAGNOSIS — M79.641 RIGHT HAND PAIN: ICD-10-CM

## 2020-02-26 DIAGNOSIS — L40.50 PSORIATIC ARTHRITIS (HCC): ICD-10-CM

## 2020-02-26 PROCEDURE — 97140 MANUAL THERAPY 1/> REGIONS: CPT

## 2020-02-26 PROCEDURE — 97112 NEUROMUSCULAR REEDUCATION: CPT

## 2020-02-26 NOTE — PROGRESS NOTES
Daily Note     Today's date: 2020  Patient name: Timoteo Gautam  : 1970  MRN: [id number]  Referring provider: Lindsay Henderson MD  Dx:   Encounter Diagnosis     ICD-10-CM    1  Arthritis of right hand M19 041    2  Right hand pain M79 641    3  Psoriatic arthritis ([address] ) L40 50        Start Time: 1115  Stop Time: 1210  Total time in clinic (min): 55 minutes    Subjective: Pt reports he is feeling the same as usual  Pt states there is a little less swelling in the R hand  Objective: See treatment diary below      Assessment:   Pt tolerated today's session well with no adverse symptoms  Pt continues to show moderate edema in R UE  Pt continues to show moderate restrictions in common flexor tendons and muscle belly  Pt will benefit from further skilled PT to increase strength, flexibility and function  Continue to progress as able  Plan: Continue per plan of care        Precautions: psoriatic arthritis     Daily Treatment Diary      Assessment   -               Cristina/Kait NAVARRETE                     FOTO   56/65       **         **   POC Signed                       HEP Issued    yes                     Manuals                            IASTM to common flexors and digits  very light graston & retromassage  IASTM to common flexors and digits               Exercise Diary    -               *Elbow AROM: Pro/Sup   20x5"  x  20x5"               *Wrist AROM: Flex/Ext  5"x10 ea 20x5" ea  x  20x5"               *Wrist Flexors Stretch  30"x3 5x30"  30"x3 3x30"               *Wrist Extensors Stretch  30"x3 5x30"   30"x3 3x30"               *Finger Flexion/Extension AROM  5"x10 ea 20x5"  x 20x5"               *Theraputty Opposition  Yellow  5"x10 ea      10x Modalities   2/13 2/17 2-24 2/26               MHP R Hand/Wrist  Pre-Tx    10'  x  10'                  * = HEP

## 2020-03-02 ENCOUNTER — OFFICE VISIT (OUTPATIENT)
Dept: PHYSICAL THERAPY | Facility: REHABILITATION | Age: 50
End: 2020-03-02
Payer: COMMERCIAL

## 2020-03-02 DIAGNOSIS — M79.641 RIGHT HAND PAIN: ICD-10-CM

## 2020-03-02 DIAGNOSIS — L40.50 PSORIATIC ARTHRITIS (HCC): ICD-10-CM

## 2020-03-02 DIAGNOSIS — M19.041 ARTHRITIS OF RIGHT HAND: Primary | ICD-10-CM

## 2020-03-02 PROCEDURE — 97112 NEUROMUSCULAR REEDUCATION: CPT

## 2020-03-02 PROCEDURE — 97140 MANUAL THERAPY 1/> REGIONS: CPT

## 2020-03-02 NOTE — PROGRESS NOTES
Daily Note     Today's date: 3/2/2020  Patient name: Timoteo Gautam  : 1970  MRN: [id number]  Referring provider: Lindsay Henderson MD  Dx:   Encounter Diagnosis     ICD-10-CM    1  Arthritis of right hand M19 041    2  Right hand pain M79 641    3  Psoriatic arthritis ([address] ) L40 50                   Subjective: Pt reports today is a good day, minimal swelling      Objective: See treatment diary below      Assessment:  Decreased swelling  Cont to have tightness, difficulty with making full fist    Hand will start to swell with MH & ex  He was moving better today so trial of MH at end with hand elevated  Felt good after treatment    Plan: Continue per plan of care        Precautions: psoriatic arthritis     Daily Treatment Diary      Assessment   -  3-2             Cristina/Reval   MD                     FOTO   56/65       **         **   POC Signed                       HEP Issued    yes                     Manuals          3-2                  IASTM to common flexors and digits  very light graston & retromassage  IASTM to common flexors and digits  CD             Exercise Diary      3-2             *Elbow AROM: Pro/Sup   20x5"  x  20x5"  x             *Wrist AROM: Flex/Ext  5"x10 ea 20x5" ea  x  20x5"  x             *Wrist Flexors Stretch  30"x3 5x30"  30"x3 3x30"  x             *Wrist Extensors Stretch  30"x3 5x30"   30"x3 3x30"  x             *Finger Flexion/Extension AROM  5"x10 ea 20x5"  x 20x5"  x             *Theraputty Opposition  Yellow  5"x10 ea      10x  x yellow digi                                                                                                                                                                                                                                                             Modalities     3-2             MHP R Hand/Wrist  Pre-Tx    10'  x  10'  post w hand elevated                * = HEP

## 2020-03-16 ENCOUNTER — APPOINTMENT (OUTPATIENT)
Dept: PHYSICAL THERAPY | Facility: REHABILITATION | Age: 50
End: 2020-03-16
Payer: COMMERCIAL

## 2020-03-18 ENCOUNTER — APPOINTMENT (OUTPATIENT)
Dept: PHYSICAL THERAPY | Facility: REHABILITATION | Age: 50
End: 2020-03-18
Payer: COMMERCIAL

## 2020-03-23 ENCOUNTER — APPOINTMENT (OUTPATIENT)
Dept: PHYSICAL THERAPY | Facility: REHABILITATION | Age: 50
End: 2020-03-23
Payer: COMMERCIAL

## 2021-05-18 ENCOUNTER — TRANSCRIBE ORDERS (OUTPATIENT)
Dept: ADMINISTRATIVE | Facility: HOSPITAL | Age: 51
End: 2021-05-18

## 2021-05-18 ENCOUNTER — HOSPITAL ENCOUNTER (OUTPATIENT)
Dept: RADIOLOGY | Facility: IMAGING CENTER | Age: 51
Discharge: HOME | End: 2021-05-18
Payer: COMMERCIAL

## 2021-05-18 DIAGNOSIS — S23.41XA SPRAIN OF COSTAL CARTILAGE, INITIAL ENCOUNTER: Primary | ICD-10-CM

## 2021-05-18 DIAGNOSIS — S23.41XA SPRAIN OF COSTAL CARTILAGE, INITIAL ENCOUNTER: ICD-10-CM

## 2021-05-18 PROCEDURE — 71046 X-RAY EXAM CHEST 2 VIEWS: CPT

## 2021-05-18 PROCEDURE — 71100 X-RAY EXAM RIBS UNI 2 VIEWS: CPT

## 2021-05-25 ENCOUNTER — EVALUATION (OUTPATIENT)
Dept: PHYSICAL THERAPY | Facility: REHABILITATION | Age: 51
End: 2021-05-25
Payer: COMMERCIAL

## 2021-05-25 ENCOUNTER — TRANSCRIBE ORDERS (OUTPATIENT)
Dept: PHYSICAL THERAPY | Facility: REHABILITATION | Age: 51
End: 2021-05-25

## 2021-05-25 DIAGNOSIS — M25.562 PAIN IN LEFT KNEE: Primary | ICD-10-CM

## 2021-05-25 DIAGNOSIS — M25.562 LEFT KNEE PAIN, UNSPECIFIED CHRONICITY: Primary | ICD-10-CM

## 2021-05-25 PROCEDURE — 97161 PT EVAL LOW COMPLEX 20 MIN: CPT | Performed by: PHYSICAL THERAPIST

## 2021-05-25 PROCEDURE — 97140 MANUAL THERAPY 1/> REGIONS: CPT | Performed by: PHYSICAL THERAPIST

## 2021-05-25 NOTE — PROGRESS NOTES
PT Evaluation     Today's date: 2021  Patient name: Nia Rodas  : 1970  MRN: [id number]  Referring provider: No ref  provider found  Dx:   Encounter Diagnosis     ICD-10-CM    1  Left knee pain, unspecified chronicity  M25 562                   Assessment  Assessment details: Pt is a pleasant 48 y o  male presenting to outpatient physical therapy with Left knee pain, unspecified chronicity  (primary encounter diagnosis)  Pt presents with pain, decreased range of motion, decreased strength, and decreased tolerance to activity  Displays movement impairment diagnosis of L quad hypomobility dysfunction  Pt is a good candidate for outpatient physical therapy and would benefit from skilled physical therapy to address limitations and to achieve goals  Thank you for this referral    Impairments: abnormal coordination, abnormal or restricted ROM, activity intolerance, impaired physical strength and pain with function  Understanding of Dx/Px/POC: good   Prognosis: good    Goals  ST  Patient will report 25% decrease in pain in 4 weeks  2  Patient will be able to climb stairs in reciprocal pattern with less than 3/10 pain level in 4 weeks  3  Patient will be able to sit to watch TV without discomfort in knees in 4 weeks  LT  Patient will be able to perform IADLS without restriction or pain by discharge  2  Patient will be independent in HEP by discharge  3  Patient will be able to return to recreational/work duties without restriction or pain by discharge        Plan  Patient would benefit from: PT eval and skilled PT  Planned modality interventions: cryotherapy and thermotherapy: hydrocollator packs  Planned therapy interventions: IADL retraining, body mechanics training, flexibility, functional ROM exercises, home exercise program, neuromuscular re-education, manual therapy, postural training, strengthening, stretching, therapeutic activities, therapeutic exercise and joint mobilization  Frequency: 2x week  Duration in visits: 8  Duration in weeks: 4  Treatment plan discussed with: patient        Subjective Evaluation    History of Present Illness  Mechanism of injury: 21  Pt comes to therapy reporting several month history of left knee pain of insidious onset  States he consulted PCP, who referred him to PT     AGGS: sitting with left leg crossed on right, sitting with LLE straight, stair climbing, walking, walking uphill  States he sits with legs cross on his couch when watching TV, which is a painful position to get out of after in this position for a while  LOC: areas of quad and patellar tendons on L, described at tender/tight/sharp  States area of patellar tendon can feel numb at times  EASES: kinesiotape   States he is currently retired  Pain  Current pain ratin  At worst pain rating: 10 (12/10)    Patient Goals  Patient goals for therapy: decreased pain          Objective     Active Range of Motion   Left Knee   Flexion: 110 degrees   Extension: WFL    Right Knee   Flexion: WFL  Extension: WFL    Strength/Myotome Testing     Left Hip   Planes of Motion   Flexion: 4  Abduction: 4-  External rotation: 4-  Internal rotation: 4    Right Hip   Planes of Motion   Flexion: 4+  Abduction: 4+  External rotation: 4  Internal rotation: 4    Left Knee   Flexion: 4+  Extension: 4-    Right Knee   Flexion: 4+  Extension: 4+    Tests     Left Knee   Negative anterior Lachman, lateral Greg, medial Greg, valgus stress test at 0 degrees, valgus stress test at 30 degrees, varus stress test at 0 degrees and varus stress test at 30 degrees       Additional Tests Details  21  TTP over L patellar tendon, quad tendon, hypertonicity of rectus femoris              Precautions: n/a    Daily Treatment Diary    Date             FOTO IE            Re-Eval IE               Manuals    IASTM L quads Adan Monroy to L patella nv                                      Neuro Re-Ed                                                                                                Ther Ex    Quad stretch - seated or standing 30"x3            Seated HS str             LAQ eccentric             SLR - flex, abd, ext             Leg curls             Leg extension             Leg press                          Ther Activity    TM                          Gait Training                              Modalities

## 2021-05-25 NOTE — LETTER
2021    [facility] [place] [address] [address]    Patient: Chantel Sena   YOB: 1970   Date of Visit: 2021     Encounter Diagnosis     ICD-10-CM    1  Left knee pain, unspecified chronicity  M25 562        Dear Dr Marly Montenegro: Thank you for your recent referral of Chantel Sena  Please review the attached evaluation summary from Denzel's recent visit  Please verify that you agree with the plan of care by signing the attached order  If you have any questions or concerns, please do not hesitate to call  I sincerely appreciate the opportunity to share in the care of one of your patients and hope to have another opportunity to work with you in the near future  Sincerely,    Cuco Armas, PT      Referring Provider:      I certify that I have read the below Plan of Care and certify the need for these services furnished under this plan of treatment while under my care  Doris Ferrer MD  [place] [address]  Via Fax: [phone]          PT Evaluation     Today's date: 2021  Patient name: Chantel Sena  : 1970  MRN: [id number]  Referring provider: No ref  provider found  Dx:   Encounter Diagnosis     ICD-10-CM    1  Left knee pain, unspecified chronicity  M25 562                   Assessment  Assessment details: Pt is a pleasant 48 y o  male presenting to outpatient physical therapy with Left knee pain, unspecified chronicity  (primary encounter diagnosis)  Pt presents with pain, decreased range of motion, decreased strength, and decreased tolerance to activity  Displays movement impairment diagnosis of L quad hypomobility dysfunction  Pt is a good candidate for outpatient physical therapy and would benefit from skilled physical therapy to address limitations and to achieve goals   Thank you for this referral    Impairments: abnormal coordination, abnormal or restricted ROM, activity intolerance, impaired physical strength and pain with function  Understanding of Dx/Px/POC: good   Prognosis: good    Goals  ST  Patient will report 25% decrease in pain in 4 weeks  2  Patient will be able to climb stairs in reciprocal pattern with less than 3/10 pain level in 4 weeks  3  Patient will be able to sit to watch TV without discomfort in knees in 4 weeks  LT  Patient will be able to perform IADLS without restriction or pain by discharge  2  Patient will be independent in HEP by discharge  3  Patient will be able to return to recreational/work duties without restriction or pain by discharge  Plan  Patient would benefit from: PT eval and skilled PT  Planned modality interventions: cryotherapy and thermotherapy: hydrocollator packs  Planned therapy interventions: IADL retraining, body mechanics training, flexibility, functional ROM exercises, home exercise program, neuromuscular re-education, manual therapy, postural training, strengthening, stretching, therapeutic activities, therapeutic exercise and joint mobilization  Frequency: 2x week  Duration in visits: 8  Duration in weeks: 4  Treatment plan discussed with: patient        Subjective Evaluation    History of Present Illness  Mechanism of injury: 21  Pt comes to therapy reporting several month history of left knee pain of insidious onset  States he consulted PCP, who referred him to PT     AGGS: sitting with left leg crossed on right, sitting with LLE straight, stair climbing, walking, walking uphill  States he sits with legs cross on his couch when watching TV, which is a painful position to get out of after in this position for a while  LOC: areas of quad and patellar tendons on L, described at tender/tight/sharp  States area of patellar tendon can feel numb at times  EASES: kinesiotape   States he is currently retired     Pain  Current pain ratin  At worst pain rating: 10 (12/10)    Patient Goals  Patient goals for therapy: decreased pain          Objective     Active Range of Motion   Left Knee   Flexion: 110 degrees   Extension: WFL    Right Knee   Flexion: WFL  Extension: WFL    Strength/Myotome Testing     Left Hip   Planes of Motion   Flexion: 4  Abduction: 4-  External rotation: 4-  Internal rotation: 4    Right Hip   Planes of Motion   Flexion: 4+  Abduction: 4+  External rotation: 4  Internal rotation: 4    Left Knee   Flexion: 4+  Extension: 4-    Right Knee   Flexion: 4+  Extension: 4+    Tests     Left Knee   Negative anterior Lachman, lateral Greg, medial Greg, valgus stress test at 0 degrees, valgus stress test at 30 degrees, varus stress test at 0 degrees and varus stress test at 30 degrees       Additional Tests Details  05/25/21  TTP over L patellar tendon, quad tendon, hypertonicity of rectus femoris              Precautions: n/a    Daily Treatment Diary    Date 5/25            FOTO IE            Re-Eval IE               Manuals    IASTM L quads Erica Grange to L patella nv                                      Neuro Re-Ed                                                                                                Ther Ex    Quad stretch - seated or standing 30"x3            Seated HS str             LAQ eccentric             SLR - flex, abd, ext             Leg curls             Leg extension             Leg press                          Ther Activity    TM                          Gait Training                              Modalities

## 2021-06-01 ENCOUNTER — OFFICE VISIT (OUTPATIENT)
Dept: PHYSICAL THERAPY | Facility: REHABILITATION | Age: 51
End: 2021-06-01
Payer: COMMERCIAL

## 2021-06-01 DIAGNOSIS — M25.562 LEFT KNEE PAIN, UNSPECIFIED CHRONICITY: Primary | ICD-10-CM

## 2021-06-01 PROCEDURE — 97110 THERAPEUTIC EXERCISES: CPT

## 2021-06-01 NOTE — PROGRESS NOTES
Daily Note     Today's date: 2021  Patient name: Ottoniel Cartwright  : 1970  MRN: [id number]  Referring provider: Georgina Mcdaniels MD  Dx:   Encounter Diagnosis     ICD-10-CM    1  Left knee pain, unspecified chronicity  M25 562                   Subjective: Pt reports that he is doing really bad today with complaints of L knee pain 10/10  "This is the worst he ever had it in his knee "      Objective: See treatment diary below      Assessment: Pt demonstrated increased tibial ER today with cuing needed to promote a straight plane when all ROM and strengthening exercises are performed  Tolerated treatment fair unable to perform a SLR or a LAQ without AAROM  Performed a more conservative treatment today with a slight decreased in pain present to end  Taping was performed with patella tightness present  An updated HEP was given  Patient demonstrated fatigue post treatment, exhibited good technique with therapeutic exercises and would benefit from continued PT      Plan: Continue per plan of care        Precautions: n/a    Daily Treatment Diary    Date            FOTO IE            Re-Eval IE               Manuals    IASTM L quads LUIS CARLOS MM           KTape to L patella nv MM                                     Neuro Re-Ed                                                                                                Ther Ex    Quad stretch - seated or standing 30"x3 30"x3           Seated HS str  30"x3           LAQ eccentric  2x10           SLR - flex, abd, ext  Abd, ext 2x10           Leg curls             Leg extension             Leg press                          Ther Activity    TM                          Gait Training                              Modalities

## 2021-06-08 ENCOUNTER — OFFICE VISIT (OUTPATIENT)
Dept: PHYSICAL THERAPY | Facility: REHABILITATION | Age: 51
End: 2021-06-08
Payer: COMMERCIAL

## 2021-06-08 DIAGNOSIS — M25.562 LEFT KNEE PAIN, UNSPECIFIED CHRONICITY: Primary | ICD-10-CM

## 2021-06-08 PROCEDURE — 97140 MANUAL THERAPY 1/> REGIONS: CPT

## 2021-06-08 PROCEDURE — 97110 THERAPEUTIC EXERCISES: CPT

## 2021-06-08 PROCEDURE — 97112 NEUROMUSCULAR REEDUCATION: CPT

## 2021-06-08 NOTE — PROGRESS NOTES
Daily Note     Today's date: 2021  Patient name: Chantel Sena  : 1970  MRN: [id number]  Referring provider: Leonor Prado MD  Dx:   Encounter Diagnosis     ICD-10-CM    1  Left knee pain, unspecified chronicity  M25 562                   Subjective:  Patient reports that he continues to have pain but the pain intensity changes day to day depending on what he is doing  Patient rates his L knee/distal quad pain as a 7/10 today  Objective: See treatment diary below      Assessment: Tolerated treatment well  Patient performed ex as noted below  Introduced leg press and ham curls this visit without patent report of L knee/quad pain  Applied cover roll and Leuko tape verses kinesio tape (which doesn't adhere to the patient's skin) in "V" to unload L fat pad and patellar tendon  Patient noted decreased pain with ambulation post taping  Patient would benefit from continued PT to attain set goals  Plan: Continue per plan of care        Precautions: n/a    Daily Treatment Diary    Date           FOTO IE            Re-Eval IE               Manuals    IASTM L quads LUIS CARLOS MM CC          KTape to L patella nv MM CC fat pad unload (V) jLeuko tape/cover roll                                     Neuro Re-Ed                                                                                                Ther Ex    Quad stretch - seated or standing 30"x3 30"x3 30"x3          Seated HS str  30"x3 30"x3          LAQ eccentric  2x10 2x10          SLR - flex, abd, ext  Abd, ext 2x10 Flex,ext 2x10 abd x10          Leg curls   33# 2x10          Leg extension   -          Leg press   88# 2x10                       Ther Activity    TM   Bike 5'                       Gait Training                              Modalities

## 2021-06-29 ENCOUNTER — APPOINTMENT (OUTPATIENT)
Dept: PHYSICAL THERAPY | Facility: REHABILITATION | Age: 51
End: 2021-06-29
Payer: COMMERCIAL

## 2021-07-21 ENCOUNTER — HOSPITAL ENCOUNTER (OUTPATIENT)
Dept: RADIOLOGY | Facility: IMAGING CENTER | Age: 51
Discharge: HOME | End: 2021-07-21
Payer: COMMERCIAL

## 2021-07-21 DIAGNOSIS — M25.562 LEFT KNEE PAIN, UNSPECIFIED CHRONICITY: ICD-10-CM

## 2021-07-21 PROCEDURE — 73562 X-RAY EXAM OF KNEE 3: CPT

## 2021-08-11 ENCOUNTER — HOSPITAL ENCOUNTER (OUTPATIENT)
Dept: RADIOLOGY | Facility: IMAGING CENTER | Age: 51
Discharge: HOME | End: 2021-08-11
Payer: COMMERCIAL

## 2021-08-11 DIAGNOSIS — M25.562 PAIN IN LEFT KNEE: ICD-10-CM

## 2021-08-11 PROCEDURE — 73721 MRI JNT OF LWR EXTRE W/O DYE: CPT

## 2021-08-30 ENCOUNTER — OFFICE VISIT (OUTPATIENT)
Dept: OBGYN CLINIC | Facility: MEDICAL CENTER | Age: 51
End: 2021-08-30
Payer: COMMERCIAL

## 2021-08-30 ENCOUNTER — APPOINTMENT (OUTPATIENT)
Dept: RADIOLOGY | Facility: MEDICAL CENTER | Age: 51
End: 2021-08-30
Payer: COMMERCIAL

## 2021-08-30 VITALS
DIASTOLIC BLOOD PRESSURE: 78 MMHG | WEIGHT: 162.2 LBS | SYSTOLIC BLOOD PRESSURE: 122 MMHG | HEART RATE: 79 BPM | HEIGHT: 65 IN | BODY MASS INDEX: 27.02 KG/M2

## 2021-08-30 DIAGNOSIS — M17.0 ARTHRITIS OF BOTH KNEES: ICD-10-CM

## 2021-08-30 DIAGNOSIS — S83.242A OTHER TEAR OF MEDIAL MENISCUS, CURRENT INJURY, LEFT KNEE, INITIAL ENCOUNTER: Primary | ICD-10-CM

## 2021-08-30 PROCEDURE — 73564 X-RAY EXAM KNEE 4 OR MORE: CPT

## 2021-08-30 PROCEDURE — 73560 X-RAY EXAM OF KNEE 1 OR 2: CPT

## 2021-08-30 PROCEDURE — 99203 OFFICE O/P NEW LOW 30 MIN: CPT | Performed by: ORTHOPAEDIC SURGERY

## 2021-08-30 RX ORDER — DICLOFENAC SODIUM 75 MG/1
75 TABLET, DELAYED RELEASE ORAL 2 TIMES DAILY PRN
Qty: 60 TABLET | Refills: 1 | Status: SHIPPED | OUTPATIENT
Start: 2021-08-30

## 2021-08-30 NOTE — PATIENT INSTRUCTIONS
Diclofenac 75mg was sent to you pharmacy today to take twice daily as needed for pain  Do NOT mix this with other anti-inflammatories such as Advil or Aleve  You may add Tylenol with this new medication, up to 3,000mg in a 24 hr period         You may have a cortisone injection 2 weeks after your second COVID vaccine

## 2021-08-30 NOTE — PROGRESS NOTES
Assessment/Plan     1  Other tear of medial meniscus, current injury, left knee, initial encounter    2  Arthritis of both knees      Orders Placed This Encounter   Procedures    XR knee 4+ vw right injury    XR knee 4+ vw left injury    XR knee 1 or 2 vw left     -  Yudith Mahoney has a medial meniscus tear of the left knee with minimal arthritic changes  -  Discussed beginning non operative management at this time  -   Should continue with home exercises he learned at therapy, the ones that do not exacerbate his pain  -  Discussed that he is taking too much Aleve  Diclofenac 75 mg was sent to his pharmacy on file today to take twice a day as needed for pain  He was instructed to not mix this with other anti-inflammatories but he may add Tylenol up to 3000 mg per day  These instructions were provided to him in writing  -  He should follow-up with his rheumatologist and begin medication again for his psoriatic arthritis  -  Patient was offered a cortisone injection today however he declined  He is in the midst of getting vaccinated for COVID  He was instructed that if he would like to have a cortisone injection this should be done two weeks after his second vaccine dose  Return if symptoms worsen or fail to improve  I answered all of the patient's questions during the visit and provided education of the patient's condition during the visit  The patient verbalized understanding of the information given and agrees with the plan  This note was dictated using [employer] software  It may contain errors including improperly dictated words  Please contact physician directly for any questions  History of Present Illness   Chief complaint:   Chief Complaint   Patient presents with    Left Knee - Pain    Right Knee - Pain       HPI: Chaitanya Foreman is a 46 y o  male that c/o bilateral knee pain, left worse than right  This began a little over a year ago without any injury or trauma    Patient localizes his left knee pain to the medial aspect  There is some clicking and popping but no true locking  He states that at the start of the [place] pandemic he discontinued his Humira which she was taking for psoriatic arthritis  He was concerned about the immune suppressant properties of this  He has not restarted this or any other rheumatologic medication at this time  He comes today with a completed MRI and an attempted course that therapy  He states therapy exacerbated his pain  He has not had any cortisone injections to this knee  He takes Aleve daily for pain and admits he takes at least seven or eight a day  His pain is worse at night  He is currently disabled due to the psoriatic arthritis  He does work in maintenance at [employer]  ROS:    See HPI for musculoskeletal review  All other systems reviewed are negative     Historical Information   Past Medical History:   Diagnosis Date    Psoriatic arthritis ([facility] [address] )      No past surgical history on file  Social History   Social History     Substance and Sexual Activity   Alcohol Use None     Social History     Substance and Sexual Activity   Drug Use Not on file     Social History     Tobacco Use   Smoking Status Never Smoker   Smokeless Tobacco Current User     Family History: No family history on file  No current outpatient medications on file prior to visit  No current facility-administered medications on file prior to visit  No Known Allergies    No current outpatient medications on file prior to visit  No current facility-administered medications on file prior to visit  Objective   Vitals: Blood pressure 122/78, pulse 79, height 5' 5" (1 651 m), weight 73 6 kg (162 lb 3 2 oz)  ,Body mass index is 26 99 kg/m²      PE:  AAOx 3  WDWN  Hearing intact, no drainage from eyes  Regular rate  no audible wheezing  no abdominal distension  LE compartments soft, skin intact    left knee:    Appearance:  no swelling   No ecchymosis  no obvious joint deformity   Mild effusion  2 psoriasis plaques on anterior knee  Palpation/Tenderness:  +TTP over medial joint line  No TTP over lateral joint line   No TTP over patella  No TTP over patellar tendon  No TTP over pes anserine bursa  Active Range of Motion:  AROM: full passive  Special Tests:  Medial Greg's Test:  Negative  Lateral Greg's Test:  Positive  Apley's compression test:  Negative  Lachman's Test:  negative  Anterior Drawer Test:  Negative  Patellar grind:  Negative  Valgus Stress Test:  negative  Varus Stress Test:  negative     Mild ipsilateral hip pain with ROM    left LE:    Sensation grossly intact  Palpable pedal pulse  AT/GS/EHL intact    Imaging Studies: I have personally reviewed pertinent films in PACS with a Radiologist   left knee:  MRI 08/11/2021 demonstrates an anterior horn medial meniscus tear                   X-ray of the left knee 07/21/2021 in addition to 08/30/2021 demonstrate mild arthritic changes      Scribe Attestation    I,:  Memo Herrera MA am acting as a scribe while in the presence of the attending physician :       I,:  Adolfo Brar DO personally performed the services described in this documentation    as scribed in my presence :

## 2025-03-19 ENCOUNTER — HOSPITAL ENCOUNTER (EMERGENCY)
Facility: HOSPITAL | Age: 55
Discharge: HOME | End: 2025-03-19
Attending: EMERGENCY MEDICINE | Admitting: EMERGENCY MEDICINE
Payer: COMMERCIAL

## 2025-03-19 ENCOUNTER — APPOINTMENT (EMERGENCY)
Dept: CT IMAGING | Facility: HOSPITAL | Age: 55
End: 2025-03-19
Payer: COMMERCIAL

## 2025-03-19 VITALS
SYSTOLIC BLOOD PRESSURE: 141 MMHG | TEMPERATURE: 97.9 F | HEART RATE: 82 BPM | DIASTOLIC BLOOD PRESSURE: 81 MMHG | OXYGEN SATURATION: 99 % | RESPIRATION RATE: 18 BRPM

## 2025-03-19 DIAGNOSIS — R10.9 LEFT FLANK PAIN: Primary | ICD-10-CM

## 2025-03-19 LAB
ANION GAP SERPL CALCULATED.3IONS-SCNC: 5 MMOL/L (ref 4–13)
BASOPHILS # BLD AUTO: 0.08 THOUSANDS/ÂΜL (ref 0–0.1)
BASOPHILS NFR BLD AUTO: 1 % (ref 0–1)
BILIRUB UR QL STRIP: NEGATIVE
BUN SERPL-MCNC: 14 MG/DL (ref 5–25)
CALCIUM SERPL-MCNC: 9.8 MG/DL (ref 8.4–10.2)
CHLORIDE SERPL-SCNC: 104 MMOL/L (ref 96–108)
CLARITY UR: CLEAR
CO2 SERPL-SCNC: 28 MMOL/L (ref 21–32)
COLOR UR: YELLOW
CREAT SERPL-MCNC: 0.79 MG/DL (ref 0.6–1.3)
EOSINOPHIL # BLD AUTO: 0.3 THOUSAND/ÂΜL (ref 0–0.61)
EOSINOPHIL NFR BLD AUTO: 5 % (ref 0–6)
ERYTHROCYTE [DISTWIDTH] IN BLOOD BY AUTOMATED COUNT: 13.1 % (ref 11.6–15.1)
GFR SERPL CREATININE-BSD FRML MDRD: 101 ML/MIN/1.73SQ M
GLUCOSE SERPL-MCNC: 97 MG/DL (ref 65–140)
GLUCOSE UR STRIP-MCNC: NEGATIVE MG/DL
HCT VFR BLD AUTO: 49.5 % (ref 36.5–49.3)
HGB BLD-MCNC: 17.5 G/DL (ref 12–17)
HGB UR QL STRIP.AUTO: NEGATIVE
IMM GRANULOCYTES # BLD AUTO: 0.01 THOUSAND/UL (ref 0–0.2)
IMM GRANULOCYTES NFR BLD AUTO: 0 % (ref 0–2)
KETONES UR STRIP-MCNC: NEGATIVE MG/DL
LEUKOCYTE ESTERASE UR QL STRIP: NEGATIVE
LYMPHOCYTES # BLD AUTO: 1.68 THOUSANDS/ÂΜL (ref 0.6–4.47)
LYMPHOCYTES NFR BLD AUTO: 29 % (ref 14–44)
MCH RBC QN AUTO: 31.9 PG (ref 26.8–34.3)
MCHC RBC AUTO-ENTMCNC: 35.4 G/DL (ref 31.4–37.4)
MCV RBC AUTO: 90 FL (ref 82–98)
MONOCYTES # BLD AUTO: 0.59 THOUSAND/ÂΜL (ref 0.17–1.22)
MONOCYTES NFR BLD AUTO: 10 % (ref 4–12)
NEUTROPHILS # BLD AUTO: 3.19 THOUSANDS/ÂΜL (ref 1.85–7.62)
NEUTS SEG NFR BLD AUTO: 55 % (ref 43–75)
NITRITE UR QL STRIP: NEGATIVE
NRBC BLD AUTO-RTO: 0 /100 WBCS
PH UR STRIP.AUTO: 6 [PH] (ref 4.5–8)
PLATELET # BLD AUTO: 249 THOUSANDS/UL (ref 149–390)
PMV BLD AUTO: 9.6 FL (ref 8.9–12.7)
POTASSIUM SERPL-SCNC: 4.2 MMOL/L (ref 3.5–5.3)
PROT UR STRIP-MCNC: NEGATIVE MG/DL
RBC # BLD AUTO: 5.48 MILLION/UL (ref 3.88–5.62)
SODIUM SERPL-SCNC: 137 MMOL/L (ref 135–147)
SP GR UR STRIP.AUTO: 1.02 (ref 1–1.03)
UROBILINOGEN UR QL STRIP.AUTO: 0.2 E.U./DL
WBC # BLD AUTO: 5.85 THOUSAND/UL (ref 4.31–10.16)

## 2025-03-19 PROCEDURE — 85025 COMPLETE CBC W/AUTO DIFF WBC: CPT | Performed by: EMERGENCY MEDICINE

## 2025-03-19 PROCEDURE — 99284 EMERGENCY DEPT VISIT MOD MDM: CPT

## 2025-03-19 PROCEDURE — 96365 THER/PROPH/DIAG IV INF INIT: CPT

## 2025-03-19 PROCEDURE — 36415 COLL VENOUS BLD VENIPUNCTURE: CPT | Performed by: EMERGENCY MEDICINE

## 2025-03-19 PROCEDURE — 74176 CT ABD & PELVIS W/O CONTRAST: CPT

## 2025-03-19 PROCEDURE — 81003 URINALYSIS AUTO W/O SCOPE: CPT

## 2025-03-19 PROCEDURE — 99284 EMERGENCY DEPT VISIT MOD MDM: CPT | Performed by: EMERGENCY MEDICINE

## 2025-03-19 PROCEDURE — 80048 BASIC METABOLIC PNL TOTAL CA: CPT | Performed by: EMERGENCY MEDICINE

## 2025-03-19 RX ORDER — SODIUM CHLORIDE, SODIUM GLUCONATE, SODIUM ACETATE, POTASSIUM CHLORIDE, MAGNESIUM CHLORIDE, SODIUM PHOSPHATE, DIBASIC, AND POTASSIUM PHOSPHATE .53; .5; .37; .037; .03; .012; .00082 G/100ML; G/100ML; G/100ML; G/100ML; G/100ML; G/100ML; G/100ML
1000 INJECTION, SOLUTION INTRAVENOUS ONCE
Status: COMPLETED | OUTPATIENT
Start: 2025-03-19 | End: 2025-03-19

## 2025-03-19 RX ORDER — NAPROXEN 500 MG/1
500 TABLET ORAL 2 TIMES DAILY PRN
Qty: 14 TABLET | Refills: 0 | Status: SHIPPED | OUTPATIENT
Start: 2025-03-19

## 2025-03-19 RX ORDER — ONDANSETRON 2 MG/ML
4 INJECTION INTRAMUSCULAR; INTRAVENOUS ONCE
Status: DISCONTINUED | OUTPATIENT
Start: 2025-03-19 | End: 2025-03-19 | Stop reason: HOSPADM

## 2025-03-19 RX ORDER — KETOROLAC TROMETHAMINE 30 MG/ML
15 INJECTION, SOLUTION INTRAMUSCULAR; INTRAVENOUS ONCE
Status: DISCONTINUED | OUTPATIENT
Start: 2025-03-19 | End: 2025-03-19 | Stop reason: HOSPADM

## 2025-03-19 RX ADMIN — SODIUM CHLORIDE, SODIUM GLUCONATE, SODIUM ACETATE, POTASSIUM CHLORIDE, MAGNESIUM CHLORIDE, SODIUM PHOSPHATE, DIBASIC, AND POTASSIUM PHOSPHATE 1000 ML: .53; .5; .37; .037; .03; .012; .00082 INJECTION, SOLUTION INTRAVENOUS at 10:25

## 2025-03-19 NOTE — DISCHARGE INSTRUCTIONS
Your ED workup is reassuring, with no findings requiring intervention.  I am treating with NSAIDs for muscle related pain.  It is possible you are still having residual pain from a passed stone which will dissipate.  Please return if you are experiencing severe increasing pain, fever, or other new concerns.

## 2025-03-19 NOTE — ED PROVIDER NOTES
"Time reflects when diagnosis was documented in both MDM as applicable and the Disposition within this note       Time User Action Codes Description Comment    3/19/2025 12:07 PM Randall Celis Add [R10.9] Left flank pain           ED Disposition       ED Disposition   Discharge    Condition   Good    Date/Time   Wed Mar 19, 2025 12:07 PM    Comment   Denzel Thomson discharge to home/self care.                   Assessment & Plan       Medical Decision Making  Based on my history and physical examination, I considered the following life or limb threatening conditions in my differential diagnosis:  renal colic, pyelonephritis, diverticulitis, musculoskeletal pain    Based on my clinical acumen, I will order the appropriate diagnostic testing to narrow my differential diagnosis and arrive at a final diagnosis.     Amount and/or Complexity of Data Reviewed  Labs: ordered. Decision-making details documented in ED Course.  Radiology: ordered. Decision-making details documented in ED Course.    Risk  Prescription drug management.        ED Course as of 03/19/25 1441   Wed Mar 19, 2025   1038 Blood, UA: Negative   1121 CT renal stone study abdomen pelvis without contrast  Imaging reviewed and interpreted myself and concur with radiologist:   \"1.  No obstructive uropathy. Nonobstructing right nephrolithiasis.   2.  Mild bladder wall thickening, correlate with urinalysis.\"   1206 Reviewed results with patient at bedside and updated on the plan.  Pain is improved.  ED workup is reassuring, with no findings requiring intervention.  I am treating with NSAIDs.  He declined muscle relaxer.        Medications   multi-electrolyte (Plasmalyte-A/Isolyte-S PH 7.4/Normosol-R) IV bolus 1,000 mL (0 mL Intravenous Stopped 3/19/25 1130)       ED Risk Strat Scores                            SBIRT 22yo+      Flowsheet Row Most Recent Value   Initial Alcohol Screen: US AUDIT-C     1. How often do you have a drink containing alcohol? 0 " Filed at: 03/19/2025 1033   2. How many drinks containing alcohol do you have on a typical day you are drinking?  0 Filed at: 03/19/2025 1033   3a. Male UNDER 65: How often do you have five or more drinks on one occasion? 0 Filed at: 03/19/2025 1033   Audit-C Score 0 Filed at: 03/19/2025 1033   LISA: How many times in the past year have you...    Used an illegal drug or used a prescription medication for non-medical reasons? Never Filed at: 03/19/2025 1033                            History of Present Illness       Chief Complaint   Patient presents with    Flank Pain     Pt c/o L sided flank pain hx kidney stones       Past Medical History:   Diagnosis Date    Psoriatic arthritis (HCC)       Past Surgical History:   Procedure Laterality Date    EGD  01/03/2011    Bloating.  Biopsy stomach negative for H. pylori biopsy of the esophagus suggested reflux by Dr. Romano      History reviewed. No pertinent family history.   Social History     Tobacco Use    Smoking status: Never    Smokeless tobacco: Current      E-Cigarette/Vaping      E-Cigarette/Vaping Substances      I have reviewed and agree with the history as documented.     53 yo M c/o severe L flank pain, onset 3/15, associated with nausea, no vomiting, no fever, similar to pain of previous kidney stones.  He had resolution of pain in the next 24 hour interval and thought he might have passed, before experiencing recurrent pain in the evening 3/16.  Since then it has been waxing and waning, improving with tylenol, no fever, no ongoing nausea or vomiting, no hematuria, no urinary urgency or frequency.  He denies abdominal pain.            Review of Systems        Objective       ED Triage Vitals [03/19/25 0946]   Temperature Pulse Blood Pressure Respirations SpO2 Patient Position - Orthostatic VS   97.9 °F (36.6 °C) 82 141/81 18 99 % Sitting      Temp Source Heart Rate Source BP Location FiO2 (%) Pain Score    Oral Monitor Left arm -- 6      Vitals      Date and  Time Temp Pulse SpO2 Resp BP Pain Score FACES Pain Rating User   03/19/25 0946 97.9 °F (36.6 °C) 82 99 % 18 141/81 6 -- CO            Physical Exam  Vitals and nursing note reviewed.   Constitutional:       Appearance: He is well-developed.   HENT:      Head: Normocephalic and atraumatic.      Right Ear: External ear normal.      Left Ear: External ear normal.      Nose: Nose normal.      Mouth/Throat:      Mouth: Mucous membranes are moist.   Eyes:      Conjunctiva/sclera: Conjunctivae normal.      Pupils: Pupils are equal, round, and reactive to light.   Cardiovascular:      Rate and Rhythm: Normal rate.   Pulmonary:      Effort: Pulmonary effort is normal.   Abdominal:      General: Abdomen is flat.      Tenderness: There is abdominal tenderness. There is left CVA tenderness.   Musculoskeletal:         General: Normal range of motion.      Cervical back: Normal range of motion and neck supple.   Skin:     General: Skin is warm and dry.      Capillary Refill: Capillary refill takes less than 2 seconds.   Neurological:      Mental Status: He is alert and oriented to person, place, and time.      Cranial Nerves: No cranial nerve deficit.      Coordination: Coordination normal.   Psychiatric:         Behavior: Behavior normal.         Thought Content: Thought content normal.         Judgment: Judgment normal.         Results Reviewed       Procedure Component Value Units Date/Time    Basic metabolic panel [[id number]] Collected: 03/19/25 1024    Lab Status: Final result Specimen: Blood from Arm, Left Updated: 03/19/25 1046     Sodium 137 mmol/L      Potassium 4.2 mmol/L      Chloride 104 mmol/L      CO2 28 mmol/L      ANION GAP 5 mmol/L      BUN 14 mg/dL      Creatinine 0.79 mg/dL      Glucose 97 mg/dL      Calcium 9.8 mg/dL      eGFR 101 ml/min/1.73sq m     Narrative:      National Kidney Disease Foundation guidelines for Chronic Kidney Disease (CKD):     Stage 1 with normal or high GFR (GFR > 90 mL/min/1.73 square  meters)    Stage 2 Mild CKD (GFR = 60-89 mL/min/1.73 square meters)    Stage 3A Moderate CKD (GFR = 45-59 mL/min/1.73 square meters)    Stage 3B Moderate CKD (GFR = 30-44 mL/min/1.73 square meters)    Stage 4 Severe CKD (GFR = 15-29 mL/min/1.73 square meters)    Stage 5 End Stage CKD (GFR <15 mL/min/1.73 square meters)  Note: GFR calculation is accurate only with a steady state creatinine    CBC and differential [[id number]]  (Abnormal) Collected: 03/19/25 1024    Lab Status: Final result Specimen: Blood from Arm, Left Updated: 03/19/25 1034     WBC 5.85 Thousand/uL      RBC 5.48 Million/uL      Hemoglobin 17.5 g/dL      Hematocrit 49.5 %      MCV 90 fL      MCH 31.9 pg      MCHC 35.4 g/dL      RDW 13.1 %      MPV 9.6 fL      Platelets 249 Thousands/uL      nRBC 0 /100 WBCs      Segmented % 55 %      Immature Grans % 0 %      Lymphocytes % 29 %      Monocytes % 10 %      Eosinophils Relative 5 %      Basophils Relative 1 %      Absolute Neutrophils 3.19 Thousands/µL      Absolute Immature Grans 0.01 Thousand/uL      Absolute Lymphocytes 1.68 Thousands/µL      Absolute Monocytes 0.59 Thousand/µL      Eosinophils Absolute 0.30 Thousand/µL      Basophils Absolute 0.08 Thousands/µL     Urine Macroscopic, POC [[id number]] Collected: 03/19/25 1025    Lab Status: Final result Specimen: Urine Updated: 03/19/25 1027     Color, UA Yellow     Clarity, UA Clear     pH, UA 6.0     Leukocytes, UA Negative     Nitrite, UA Negative     Protein, UA Negative mg/dl      Glucose, UA Negative mg/dl      Ketones, UA Negative mg/dl      Urobilinogen, UA 0.2 E.U./dl      Bilirubin, UA Negative     Occult Blood, UA Negative     Specific Gravity, UA 1.025    Narrative:      CL[facility] RESULT            CT renal stone study abdomen pelvis without contrast   Final Interpretation by Ricardo Salinas MD (03/19 1113)      1.  No obstructive uropathy. Nonobstructing right nephrolithiasis.   2.  Mild bladder wall thickening, correlate with urinalysis.          Workstation performed: [id number]             Procedures    ED Medication and Procedure Management   Prior to Admission Medications   Prescriptions Last Dose Informant Patient Reported? Taking?   diclofenac (VOLTAREN) 75 mg EC tablet   No No   Sig: Take 1 tablet (75 mg total) by mouth 2 (two) times a day as needed (knee pain)      Facility-Administered Medications: None     Discharge Medication List as of 3/19/2025 12:09 PM        START taking these medications    Details   naproxen (NAPROSYN) 500 mg tablet Take 1 tablet (500 mg total) by mouth 2 (two) times a day as needed for mild pain or moderate pain for up to 14 doses, Starting Wed 3/19/2025, Normal           STOP taking these medications       diclofenac (VOLTAREN) 75 mg EC tablet Comments:   Reason for Stopping:             No discharge procedures on file.  ED SEPSIS DOCUMENTATION   Time reflects when diagnosis was documented in both MDM as applicable and the Disposition within this note       Time User Action Codes Description Comment    3/19/2025 12:07 PM Randall Celis Add [R10.9] Left flank pain                  Randall Celis MD  03/19/25 1229